# Patient Record
Sex: MALE | Race: WHITE | Employment: OTHER
[De-identification: names, ages, dates, MRNs, and addresses within clinical notes are randomized per-mention and may not be internally consistent; named-entity substitution may affect disease eponyms.]

---

## 2017-01-06 ENCOUNTER — ANTI-COAG VISIT (OUTPATIENT)
Dept: FAMILY MEDICINE CLINIC | Facility: CLINIC | Age: 82
End: 2017-01-06

## 2017-01-06 DIAGNOSIS — I82.409 ACUTE THROMBOEMBOLISM OF DEEP VEINS OF LOWER EXTREMITY, UNSPECIFIED LATERALITY (HCC): ICD-10-CM

## 2017-01-06 LAB
INTERNATIONAL NORMALIZATION RATIO, POC: 2.9
PROTHROMBIN TIME, POC: 34.9

## 2017-01-06 PROCEDURE — 85610 PROTHROMBIN TIME: CPT | Performed by: PEDIATRICS

## 2017-01-17 DIAGNOSIS — F02.80 LATE ONSET ALZHEIMER'S DISEASE WITHOUT BEHAVIORAL DISTURBANCE (HCC): ICD-10-CM

## 2017-01-17 DIAGNOSIS — N40.0 BENIGN NON-NODULAR PROSTATIC HYPERPLASIA WITHOUT LOWER URINARY TRACT SYMPTOMS: ICD-10-CM

## 2017-01-17 DIAGNOSIS — I10 ESSENTIAL HYPERTENSION: ICD-10-CM

## 2017-01-17 DIAGNOSIS — I82.501 CHRONIC DEEP VEIN THROMBOSIS (DVT) OF RIGHT LOWER EXTREMITY, UNSPECIFIED VEIN (HCC): ICD-10-CM

## 2017-01-17 DIAGNOSIS — G30.1 LATE ONSET ALZHEIMER'S DISEASE WITHOUT BEHAVIORAL DISTURBANCE (HCC): ICD-10-CM

## 2017-01-17 DIAGNOSIS — E78.2 MIXED HYPERLIPIDEMIA: ICD-10-CM

## 2017-01-18 RX ORDER — LEVOTHYROXINE SODIUM 0.07 MG/1
75 TABLET ORAL DAILY
Qty: 90 TABLET | Refills: 1 | Status: SHIPPED | OUTPATIENT
Start: 2017-01-18 | End: 2017-07-10 | Stop reason: SDUPTHER

## 2017-01-18 RX ORDER — MEMANTINE HYDROCHLORIDE 5 MG/1
5 TABLET ORAL 2 TIMES DAILY
Qty: 180 TABLET | Refills: 1 | Status: SHIPPED | OUTPATIENT
Start: 2017-01-18 | End: 2017-07-06 | Stop reason: SDUPTHER

## 2017-01-18 RX ORDER — PANTOPRAZOLE SODIUM 40 MG/1
40 TABLET, DELAYED RELEASE ORAL DAILY
Qty: 90 TABLET | Refills: 1 | Status: SHIPPED | OUTPATIENT
Start: 2017-01-18 | End: 2017-07-10 | Stop reason: SDUPTHER

## 2017-01-18 RX ORDER — LOVASTATIN 40 MG/1
TABLET ORAL
Qty: 180 TABLET | Refills: 1 | Status: SHIPPED | OUTPATIENT
Start: 2017-01-18 | End: 2017-07-10 | Stop reason: SDUPTHER

## 2017-01-18 RX ORDER — ATENOLOL AND CHLORTHALIDONE TABLET 50; 25 MG/1; MG/1
0.5 TABLET ORAL DAILY
Qty: 45 TABLET | Refills: 1 | Status: SHIPPED | OUTPATIENT
Start: 2017-01-18 | End: 2017-07-10 | Stop reason: SDUPTHER

## 2017-01-18 RX ORDER — DUTASTERIDE 0.5 MG/1
0.5 CAPSULE, LIQUID FILLED ORAL DAILY
Qty: 90 CAPSULE | Refills: 1 | Status: SHIPPED | OUTPATIENT
Start: 2017-01-18 | End: 2017-07-06 | Stop reason: SDUPTHER

## 2017-01-18 RX ORDER — WARFARIN SODIUM 5 MG/1
TABLET ORAL
Qty: 90 TABLET | Refills: 1 | Status: SHIPPED | OUTPATIENT
Start: 2017-01-18 | End: 2017-06-26 | Stop reason: ALTCHOICE

## 2017-01-18 RX ORDER — SILODOSIN 8 MG/1
8 CAPSULE ORAL EVERY EVENING
Qty: 90 CAPSULE | Refills: 1 | Status: SHIPPED | OUTPATIENT
Start: 2017-01-18 | End: 2017-07-10 | Stop reason: SDUPTHER

## 2017-01-18 RX ORDER — DONEPEZIL HYDROCHLORIDE 10 MG/1
10 TABLET, FILM COATED ORAL NIGHTLY
Qty: 90 TABLET | Refills: 1 | Status: SHIPPED | OUTPATIENT
Start: 2017-01-18 | End: 2017-07-10 | Stop reason: SDUPTHER

## 2017-01-18 RX ORDER — POTASSIUM CITRATE 10 MEQ/1
30 TABLET, EXTENDED RELEASE ORAL 2 TIMES DAILY
Qty: 540 TABLET | Refills: 1 | Status: SHIPPED | OUTPATIENT
Start: 2017-01-18 | End: 2017-07-06 | Stop reason: SDUPTHER

## 2017-02-03 ENCOUNTER — ANTI-COAG VISIT (OUTPATIENT)
Dept: FAMILY MEDICINE CLINIC | Facility: CLINIC | Age: 82
End: 2017-02-03

## 2017-02-03 DIAGNOSIS — I82.409 ACUTE THROMBOEMBOLISM OF DEEP VEINS OF LOWER EXTREMITY, UNSPECIFIED LATERALITY (HCC): ICD-10-CM

## 2017-02-03 LAB
INTERNATIONAL NORMALIZATION RATIO, POC: 2.1
PROTHROMBIN TIME, POC: 25.4

## 2017-02-03 PROCEDURE — 85610 PROTHROMBIN TIME: CPT | Performed by: PEDIATRICS

## 2017-03-02 ENCOUNTER — ANTI-COAG VISIT (OUTPATIENT)
Dept: FAMILY MEDICINE CLINIC | Facility: CLINIC | Age: 82
End: 2017-03-02

## 2017-03-02 VITALS — TEMPERATURE: 97.4 F | HEART RATE: 84 BPM | SYSTOLIC BLOOD PRESSURE: 124 MMHG | DIASTOLIC BLOOD PRESSURE: 72 MMHG

## 2017-03-02 DIAGNOSIS — I82.409 ACUTE THROMBOEMBOLISM OF DEEP VEINS OF LOWER EXTREMITY, UNSPECIFIED LATERALITY (HCC): ICD-10-CM

## 2017-03-02 LAB
INTERNATIONAL NORMALIZATION RATIO, POC: 1.9
PROTHROMBIN TIME, POC: 22.7

## 2017-03-02 PROCEDURE — 85610 PROTHROMBIN TIME: CPT | Performed by: PEDIATRICS

## 2017-03-31 ENCOUNTER — ANTI-COAG VISIT (OUTPATIENT)
Dept: FAMILY MEDICINE CLINIC | Age: 82
End: 2017-03-31

## 2017-03-31 DIAGNOSIS — I82.409 ACUTE THROMBOEMBOLISM OF DEEP VEINS OF LOWER EXTREMITY, UNSPECIFIED LATERALITY (HCC): ICD-10-CM

## 2017-03-31 LAB
INTERNATIONAL NORMALIZATION RATIO, POC: 2.3
PROTHROMBIN TIME, POC: 27.7

## 2017-04-28 ENCOUNTER — ANTI-COAG VISIT (OUTPATIENT)
Dept: FAMILY MEDICINE CLINIC | Age: 82
End: 2017-04-28
Payer: MEDICARE

## 2017-04-28 DIAGNOSIS — I82.409 ACUTE THROMBOEMBOLISM OF DEEP VEINS OF LOWER EXTREMITY, UNSPECIFIED LATERALITY (HCC): ICD-10-CM

## 2017-04-28 LAB
INTERNATIONAL NORMALIZATION RATIO, POC: 2.2
PROTHROMBIN TIME, POC: 26.3

## 2017-04-28 PROCEDURE — 85610 PROTHROMBIN TIME: CPT | Performed by: NURSE PRACTITIONER

## 2017-05-26 ENCOUNTER — ANTI-COAG VISIT (OUTPATIENT)
Dept: FAMILY MEDICINE CLINIC | Age: 82
End: 2017-05-26
Payer: MEDICARE

## 2017-05-26 DIAGNOSIS — I82.409 ACUTE THROMBOEMBOLISM OF DEEP VEINS OF LOWER EXTREMITY, UNSPECIFIED LATERALITY (HCC): ICD-10-CM

## 2017-05-26 LAB
INTERNATIONAL NORMALIZATION RATIO, POC: 3
PROTHROMBIN TIME, POC: 36.1

## 2017-05-26 PROCEDURE — 85610 PROTHROMBIN TIME: CPT | Performed by: PEDIATRICS

## 2017-06-16 ENCOUNTER — ANTI-COAG VISIT (OUTPATIENT)
Dept: FAMILY MEDICINE CLINIC | Age: 82
End: 2017-06-16
Payer: MEDICARE

## 2017-06-16 DIAGNOSIS — I82.409 ACUTE THROMBOEMBOLISM OF DEEP VEINS OF LOWER EXTREMITY, UNSPECIFIED LATERALITY (HCC): ICD-10-CM

## 2017-06-16 LAB
INTERNATIONAL NORMALIZATION RATIO, POC: 4.1
PROTHROMBIN TIME, POC: 49.5

## 2017-06-16 PROCEDURE — 85610 PROTHROMBIN TIME: CPT | Performed by: PEDIATRICS

## 2017-06-19 ENCOUNTER — ANTI-COAG VISIT (OUTPATIENT)
Dept: FAMILY MEDICINE CLINIC | Age: 82
End: 2017-06-19
Payer: MEDICARE

## 2017-06-19 DIAGNOSIS — I82.409 ACUTE THROMBOEMBOLISM OF DEEP VEINS OF LOWER EXTREMITY, UNSPECIFIED LATERALITY (HCC): ICD-10-CM

## 2017-06-19 LAB
INTERNATIONAL NORMALIZATION RATIO, POC: 1.9
PROTHROMBIN TIME, POC: 22.5

## 2017-06-19 PROCEDURE — 85610 PROTHROMBIN TIME: CPT | Performed by: PEDIATRICS

## 2017-06-19 PROCEDURE — 1036F TOBACCO NON-USER: CPT | Performed by: PEDIATRICS

## 2017-06-20 ENCOUNTER — TELEPHONE (OUTPATIENT)
Dept: FAMILY MEDICINE CLINIC | Age: 82
End: 2017-06-20

## 2017-06-20 DIAGNOSIS — E78.2 MIXED HYPERLIPIDEMIA: ICD-10-CM

## 2017-06-20 DIAGNOSIS — E03.9 HYPOTHYROIDISM, UNSPECIFIED TYPE: ICD-10-CM

## 2017-06-20 DIAGNOSIS — I10 ESSENTIAL HYPERTENSION: Primary | ICD-10-CM

## 2017-06-22 ENCOUNTER — TELEPHONE (OUTPATIENT)
Dept: FAMILY MEDICINE CLINIC | Age: 82
End: 2017-06-22

## 2017-06-22 ENCOUNTER — ANTI-COAG VISIT (OUTPATIENT)
Dept: FAMILY MEDICINE CLINIC | Age: 82
End: 2017-06-22
Payer: MEDICARE

## 2017-06-22 DIAGNOSIS — I82.409 ACUTE THROMBOEMBOLISM OF DEEP VEINS OF LOWER EXTREMITY, UNSPECIFIED LATERALITY (HCC): ICD-10-CM

## 2017-06-22 LAB
INTERNATIONAL NORMALIZATION RATIO, POC: 1.3
PROTHROMBIN TIME, POC: 15.1

## 2017-06-22 PROCEDURE — 85610 PROTHROMBIN TIME: CPT | Performed by: NURSE PRACTITIONER

## 2017-06-23 ENCOUNTER — HOSPITAL ENCOUNTER (OUTPATIENT)
Age: 82
Setting detail: SPECIMEN
Discharge: HOME | End: 2017-06-23
Payer: MEDICARE

## 2017-06-23 DIAGNOSIS — E78.2 MIXED HYPERLIPIDEMIA: ICD-10-CM

## 2017-06-23 DIAGNOSIS — E03.9 HYPOTHYROIDISM, UNSPECIFIED TYPE: ICD-10-CM

## 2017-06-23 DIAGNOSIS — I10 ESSENTIAL HYPERTENSION: ICD-10-CM

## 2017-06-23 LAB
ALBUMIN SERPL-MCNC: 4 G/DL (ref 3.5–5.2)
ALBUMIN/GLOBULIN RATIO: 1.9 (ref 1–2.5)
ALP BLD-CCNC: 92 U/L (ref 40–129)
ALT SERPL-CCNC: 8 U/L (ref 5–41)
ANION GAP SERPL CALCULATED.3IONS-SCNC: 12 MMOL/L (ref 9–17)
AST SERPL-CCNC: 13 U/L
BILIRUB SERPL-MCNC: 0.69 MG/DL (ref 0.3–1.2)
BUN BLDV-MCNC: 21 MG/DL (ref 8–23)
BUN/CREAT BLD: ABNORMAL (ref 9–20)
CALCIUM SERPL-MCNC: 10 MG/DL (ref 8.6–10.4)
CHLORIDE BLD-SCNC: 103 MMOL/L (ref 98–107)
CHOLESTEROL, FASTING: 171 MG/DL
CHOLESTEROL/HDL RATIO: 4
CO2: 29 MMOL/L (ref 20–31)
CREAT SERPL-MCNC: 1.42 MG/DL (ref 0.7–1.2)
GFR AFRICAN AMERICAN: 57 ML/MIN
GFR NON-AFRICAN AMERICAN: 47 ML/MIN
GFR SERPL CREATININE-BSD FRML MDRD: ABNORMAL ML/MIN/{1.73_M2}
GFR SERPL CREATININE-BSD FRML MDRD: ABNORMAL ML/MIN/{1.73_M2}
GLUCOSE FASTING: 87 MG/DL (ref 70–99)
HCT VFR BLD CALC: 47 % (ref 41–53)
HDLC SERPL-MCNC: 43 MG/DL
HEMOGLOBIN: 15.7 G/DL (ref 13.5–17.5)
LDL CHOLESTEROL: 90 MG/DL (ref 0–130)
MCH RBC QN AUTO: 31.4 PG (ref 26–34)
MCHC RBC AUTO-ENTMCNC: 33.5 G/DL (ref 31–37)
MCV RBC AUTO: 93.6 FL (ref 80–100)
PDW BLD-RTO: 15.4 % (ref 12.5–15.4)
PLATELET # BLD: 165 K/UL (ref 140–450)
PMV BLD AUTO: 9.9 FL (ref 6–12)
POTASSIUM SERPL-SCNC: 4.6 MMOL/L (ref 3.7–5.3)
RBC # BLD: 5.02 M/UL (ref 4.5–5.9)
SODIUM BLD-SCNC: 144 MMOL/L (ref 135–144)
TOTAL PROTEIN: 6.1 G/DL (ref 6.4–8.3)
TRIGLYCERIDE, FASTING: 192 MG/DL
TSH SERPL DL<=0.05 MIU/L-ACNC: 3.8 MIU/L (ref 0.3–5)
VLDLC SERPL CALC-MCNC: ABNORMAL MG/DL (ref 1–30)
WBC # BLD: 7.9 K/UL (ref 3.5–11)

## 2017-06-26 ENCOUNTER — OFFICE VISIT (OUTPATIENT)
Dept: FAMILY MEDICINE CLINIC | Age: 82
End: 2017-06-26
Payer: MEDICARE

## 2017-06-26 VITALS
WEIGHT: 180 LBS | HEIGHT: 67 IN | BODY MASS INDEX: 28.25 KG/M2 | DIASTOLIC BLOOD PRESSURE: 62 MMHG | RESPIRATION RATE: 20 BRPM | HEART RATE: 60 BPM | SYSTOLIC BLOOD PRESSURE: 90 MMHG | TEMPERATURE: 96 F

## 2017-06-26 DIAGNOSIS — E78.00 PURE HYPERCHOLESTEROLEMIA: ICD-10-CM

## 2017-06-26 DIAGNOSIS — F02.80 LATE ONSET ALZHEIMER'S DISEASE WITHOUT BEHAVIORAL DISTURBANCE (HCC): ICD-10-CM

## 2017-06-26 DIAGNOSIS — K21.9 GASTROESOPHAGEAL REFLUX DISEASE WITHOUT ESOPHAGITIS: ICD-10-CM

## 2017-06-26 DIAGNOSIS — G30.1 LATE ONSET ALZHEIMER'S DISEASE WITHOUT BEHAVIORAL DISTURBANCE (HCC): ICD-10-CM

## 2017-06-26 DIAGNOSIS — I82.501 CHRONIC DEEP VEIN THROMBOSIS (DVT) OF RIGHT LOWER EXTREMITY, UNSPECIFIED VEIN (HCC): ICD-10-CM

## 2017-06-26 DIAGNOSIS — I10 ESSENTIAL HYPERTENSION: Primary | ICD-10-CM

## 2017-06-26 DIAGNOSIS — Z23 NEED FOR TDAP VACCINATION: ICD-10-CM

## 2017-06-26 DIAGNOSIS — E03.9 HYPOTHYROIDISM, UNSPECIFIED TYPE: ICD-10-CM

## 2017-06-26 PROCEDURE — G8419 CALC BMI OUT NRM PARAM NOF/U: HCPCS | Performed by: NURSE PRACTITIONER

## 2017-06-26 PROCEDURE — 1123F ACP DISCUSS/DSCN MKR DOCD: CPT | Performed by: NURSE PRACTITIONER

## 2017-06-26 PROCEDURE — G8598 ASA/ANTIPLAT THER USED: HCPCS | Performed by: NURSE PRACTITIONER

## 2017-06-26 PROCEDURE — G8427 DOCREV CUR MEDS BY ELIG CLIN: HCPCS | Performed by: NURSE PRACTITIONER

## 2017-06-26 PROCEDURE — 99214 OFFICE O/P EST MOD 30 MIN: CPT | Performed by: NURSE PRACTITIONER

## 2017-06-26 PROCEDURE — 1036F TOBACCO NON-USER: CPT | Performed by: NURSE PRACTITIONER

## 2017-06-26 PROCEDURE — 4040F PNEUMOC VAC/ADMIN/RCVD: CPT | Performed by: NURSE PRACTITIONER

## 2017-06-26 ASSESSMENT — ENCOUNTER SYMPTOMS
COUGH: 0
CONSTIPATION: 0
SORE THROAT: 0
TROUBLE SWALLOWING: 0
EYE PAIN: 0
WHEEZING: 0
ABDOMINAL PAIN: 0
SHORTNESS OF BREATH: 0
EYE DISCHARGE: 0
BACK PAIN: 0
NAUSEA: 0
VOMITING: 0
DIARRHEA: 1
RHINORRHEA: 1

## 2017-06-27 DIAGNOSIS — I82.501 CHRONIC DEEP VEIN THROMBOSIS (DVT) OF RIGHT LOWER EXTREMITY, UNSPECIFIED VEIN (HCC): ICD-10-CM

## 2017-07-06 DIAGNOSIS — G30.1 LATE ONSET ALZHEIMER'S DISEASE WITHOUT BEHAVIORAL DISTURBANCE (HCC): ICD-10-CM

## 2017-07-06 DIAGNOSIS — I82.501 CHRONIC DEEP VEIN THROMBOSIS (DVT) OF RIGHT LOWER EXTREMITY, UNSPECIFIED VEIN (HCC): ICD-10-CM

## 2017-07-06 DIAGNOSIS — F02.80 LATE ONSET ALZHEIMER'S DISEASE WITHOUT BEHAVIORAL DISTURBANCE (HCC): ICD-10-CM

## 2017-07-06 RX ORDER — WARFARIN SODIUM 5 MG/1
TABLET ORAL
Qty: 65 TABLET | Refills: 1 | OUTPATIENT
Start: 2017-07-06

## 2017-07-07 RX ORDER — MEMANTINE HYDROCHLORIDE 5 MG/1
TABLET ORAL
Qty: 180 TABLET | Refills: 1 | Status: SHIPPED | OUTPATIENT
Start: 2017-07-07 | End: 2018-01-03 | Stop reason: SDUPTHER

## 2017-07-07 RX ORDER — POTASSIUM CITRATE 10 MEQ/1
TABLET, EXTENDED RELEASE ORAL
Qty: 540 TABLET | Refills: 1 | Status: SHIPPED | OUTPATIENT
Start: 2017-07-07 | End: 2018-01-03 | Stop reason: SDUPTHER

## 2017-07-07 RX ORDER — DUTASTERIDE 0.5 MG/1
CAPSULE, LIQUID FILLED ORAL
Qty: 90 CAPSULE | Refills: 1 | Status: SHIPPED | OUTPATIENT
Start: 2017-07-07 | End: 2018-01-03 | Stop reason: SDUPTHER

## 2017-07-10 DIAGNOSIS — F02.80 LATE ONSET ALZHEIMER'S DISEASE WITHOUT BEHAVIORAL DISTURBANCE (HCC): ICD-10-CM

## 2017-07-10 DIAGNOSIS — I10 ESSENTIAL HYPERTENSION: ICD-10-CM

## 2017-07-10 DIAGNOSIS — N40.0 BENIGN NON-NODULAR PROSTATIC HYPERPLASIA WITHOUT LOWER URINARY TRACT SYMPTOMS: ICD-10-CM

## 2017-07-10 DIAGNOSIS — E78.2 MIXED HYPERLIPIDEMIA: ICD-10-CM

## 2017-07-10 DIAGNOSIS — G30.1 LATE ONSET ALZHEIMER'S DISEASE WITHOUT BEHAVIORAL DISTURBANCE (HCC): ICD-10-CM

## 2017-07-10 RX ORDER — LOVASTATIN 40 MG/1
TABLET ORAL
Qty: 180 TABLET | Refills: 1 | Status: SHIPPED | OUTPATIENT
Start: 2017-07-10 | End: 2018-01-03 | Stop reason: SDUPTHER

## 2017-07-10 RX ORDER — LEVOTHYROXINE SODIUM 0.07 MG/1
75 TABLET ORAL DAILY
Qty: 90 TABLET | Refills: 3 | Status: SHIPPED | OUTPATIENT
Start: 2017-07-10 | End: 2018-06-24 | Stop reason: SDUPTHER

## 2017-07-10 RX ORDER — ATENOLOL AND CHLORTHALIDONE TABLET 50; 25 MG/1; MG/1
0.5 TABLET ORAL DAILY
Qty: 45 TABLET | Refills: 1 | Status: SHIPPED | OUTPATIENT
Start: 2017-07-10 | End: 2018-01-03 | Stop reason: SDUPTHER

## 2017-07-10 RX ORDER — DONEPEZIL HYDROCHLORIDE 10 MG/1
10 TABLET, FILM COATED ORAL NIGHTLY
Qty: 90 TABLET | Refills: 1 | Status: SHIPPED | OUTPATIENT
Start: 2017-07-10 | End: 2018-02-01 | Stop reason: SDUPTHER

## 2017-07-10 RX ORDER — SILODOSIN 8 MG/1
8 CAPSULE ORAL EVERY EVENING
Qty: 90 CAPSULE | Refills: 1 | Status: SHIPPED | OUTPATIENT
Start: 2017-07-10 | End: 2018-01-03 | Stop reason: SDUPTHER

## 2017-07-10 RX ORDER — PANTOPRAZOLE SODIUM 40 MG/1
40 TABLET, DELAYED RELEASE ORAL DAILY
Qty: 90 TABLET | Refills: 1 | Status: SHIPPED | OUTPATIENT
Start: 2017-07-10 | End: 2018-01-03 | Stop reason: SDUPTHER

## 2017-12-06 ENCOUNTER — ANTI-COAG VISIT (OUTPATIENT)
Dept: FAMILY MEDICINE CLINIC | Age: 82
End: 2017-12-06

## 2018-01-03 DIAGNOSIS — F02.80 LATE ONSET ALZHEIMER'S DISEASE WITHOUT BEHAVIORAL DISTURBANCE (HCC): ICD-10-CM

## 2018-01-03 DIAGNOSIS — G30.1 LATE ONSET ALZHEIMER'S DISEASE WITHOUT BEHAVIORAL DISTURBANCE (HCC): ICD-10-CM

## 2018-01-03 DIAGNOSIS — N40.0 BENIGN NON-NODULAR PROSTATIC HYPERPLASIA WITHOUT LOWER URINARY TRACT SYMPTOMS: ICD-10-CM

## 2018-01-03 DIAGNOSIS — E78.2 MIXED HYPERLIPIDEMIA: ICD-10-CM

## 2018-01-03 DIAGNOSIS — I10 ESSENTIAL HYPERTENSION: ICD-10-CM

## 2018-01-03 RX ORDER — POTASSIUM CITRATE 10 MEQ/1
30 TABLET, EXTENDED RELEASE ORAL 2 TIMES DAILY
Qty: 540 TABLET | Refills: 1 | Status: SHIPPED | OUTPATIENT
Start: 2018-01-03 | End: 2020-08-11

## 2018-01-03 RX ORDER — SILODOSIN 8 MG/1
8 CAPSULE ORAL EVERY EVENING
Qty: 90 CAPSULE | Refills: 1 | Status: SHIPPED | OUTPATIENT
Start: 2018-01-03 | End: 2018-06-25 | Stop reason: SDUPTHER

## 2018-01-03 RX ORDER — MEMANTINE HYDROCHLORIDE 5 MG/1
5 TABLET ORAL 2 TIMES DAILY
Qty: 180 TABLET | Refills: 1 | Status: SHIPPED | OUTPATIENT
Start: 2018-01-03 | End: 2018-06-25 | Stop reason: SDUPTHER

## 2018-01-03 RX ORDER — ATENOLOL AND CHLORTHALIDONE TABLET 50; 25 MG/1; MG/1
0.5 TABLET ORAL DAILY
Qty: 45 TABLET | Refills: 1 | Status: SHIPPED | OUTPATIENT
Start: 2018-01-03 | End: 2018-01-31 | Stop reason: ALTCHOICE

## 2018-01-03 RX ORDER — PANTOPRAZOLE SODIUM 40 MG/1
40 TABLET, DELAYED RELEASE ORAL DAILY
Qty: 90 TABLET | Refills: 1 | Status: SHIPPED | OUTPATIENT
Start: 2018-01-03 | End: 2018-06-25 | Stop reason: SDUPTHER

## 2018-01-03 RX ORDER — LOVASTATIN 40 MG/1
80 TABLET ORAL NIGHTLY
Qty: 180 TABLET | Refills: 1 | Status: SHIPPED | OUTPATIENT
Start: 2018-01-03 | End: 2018-06-25 | Stop reason: SDUPTHER

## 2018-01-03 RX ORDER — DUTASTERIDE 0.5 MG/1
0.5 CAPSULE, LIQUID FILLED ORAL DAILY
Qty: 90 CAPSULE | Refills: 1 | Status: SHIPPED | OUTPATIENT
Start: 2018-01-03 | End: 2018-06-25 | Stop reason: SDUPTHER

## 2018-01-03 NOTE — TELEPHONE ENCOUNTER
LOV: 06/26/17  RTO: 3 months  LR: 07/10/17    Health Maintenance   Topic Date Due    DTaP/Tdap/Td vaccine (1 - Tdap) 03/05/1945    Zostavax vaccine  03/05/1986    Flu vaccine (1) 09/01/2017    Pneumococcal low/med risk  Completed             (applicable per patient's age: Cancer Screenings, Depression Screening, Fall Risk Screening, Immunizations)    Hemoglobin A1C (%)   Date Value   10/15/2015 5.1   05/21/2013 5.6     LDL Cholesterol (mg/dL)   Date Value   06/23/2017 90     AST (U/L)   Date Value   06/23/2017 13     ALT (U/L)   Date Value   06/23/2017 8     BUN (mg/dL)   Date Value   06/23/2017 21      (goal A1C is < 7)   (goal LDL is <100) need 30-50% reduction from baseline     BP Readings from Last 3 Encounters:   06/26/17 90/62   03/02/17 124/72   10/14/16 130/80    (goal /80)      All Future Testing planned in [facility]:  Lab Frequency Next Occurrence   POCT INR         Next Visit Date:  Future Appointments  Date Time Provider Treasure De   1/15/2018 10:40 AM Cody Pérez MD [facility]            Patient Active Problem List:     BPH (benign prostatic hyperplasia)     Hypertension     Hyperlipidemia     Hypothyroidism     CAD (coronary artery disease)     Deep venous thrombosis of leg (HCC)     GERD (gastroesophageal reflux disease)     Alzheimer's dementia

## 2018-01-12 DIAGNOSIS — I82.501 CHRONIC DEEP VEIN THROMBOSIS (DVT) OF RIGHT LOWER EXTREMITY, UNSPECIFIED VEIN (HCC): ICD-10-CM

## 2018-01-15 ENCOUNTER — OFFICE VISIT (OUTPATIENT)
Dept: FAMILY MEDICINE CLINIC | Age: 83
End: 2018-01-15
Payer: MEDICARE

## 2018-01-15 VITALS
BODY MASS INDEX: 28.41 KG/M2 | DIASTOLIC BLOOD PRESSURE: 56 MMHG | HEART RATE: 60 BPM | TEMPERATURE: 96.1 F | WEIGHT: 181 LBS | RESPIRATION RATE: 16 BRPM | SYSTOLIC BLOOD PRESSURE: 90 MMHG | HEIGHT: 67 IN

## 2018-01-15 DIAGNOSIS — I25.10 CORONARY ARTERY DISEASE INVOLVING NATIVE CORONARY ARTERY OF NATIVE HEART WITHOUT ANGINA PECTORIS: ICD-10-CM

## 2018-01-15 DIAGNOSIS — Z86.718 HISTORY OF DVT OF LOWER EXTREMITY: ICD-10-CM

## 2018-01-15 DIAGNOSIS — N18.30 CHRONIC KIDNEY DISEASE, STAGE 3 (HCC): ICD-10-CM

## 2018-01-15 DIAGNOSIS — E78.00 PURE HYPERCHOLESTEROLEMIA: Primary | ICD-10-CM

## 2018-01-15 DIAGNOSIS — I10 ESSENTIAL HYPERTENSION: ICD-10-CM

## 2018-01-15 DIAGNOSIS — I70.0 THORACIC AORTIC ATHEROSCLEROSIS (HCC): ICD-10-CM

## 2018-01-15 PROCEDURE — 99214 OFFICE O/P EST MOD 30 MIN: CPT | Performed by: PEDIATRICS

## 2018-01-15 PROCEDURE — 1123F ACP DISCUSS/DSCN MKR DOCD: CPT | Performed by: PEDIATRICS

## 2018-01-15 PROCEDURE — G8598 ASA/ANTIPLAT THER USED: HCPCS | Performed by: PEDIATRICS

## 2018-01-15 PROCEDURE — G8419 CALC BMI OUT NRM PARAM NOF/U: HCPCS | Performed by: PEDIATRICS

## 2018-01-15 PROCEDURE — 4040F PNEUMOC VAC/ADMIN/RCVD: CPT | Performed by: PEDIATRICS

## 2018-01-15 PROCEDURE — G8427 DOCREV CUR MEDS BY ELIG CLIN: HCPCS | Performed by: PEDIATRICS

## 2018-01-15 PROCEDURE — G8482 FLU IMMUNIZE ORDER/ADMIN: HCPCS | Performed by: PEDIATRICS

## 2018-01-15 PROCEDURE — 1036F TOBACCO NON-USER: CPT | Performed by: PEDIATRICS

## 2018-01-15 ASSESSMENT — PATIENT HEALTH QUESTIONNAIRE - PHQ9
1. LITTLE INTEREST OR PLEASURE IN DOING THINGS: 0
2. FEELING DOWN, DEPRESSED OR HOPELESS: 0
SUM OF ALL RESPONSES TO PHQ9 QUESTIONS 1 & 2: 0
SUM OF ALL RESPONSES TO PHQ QUESTIONS 1-9: 0

## 2018-01-15 ASSESSMENT — ENCOUNTER SYMPTOMS
CONSTIPATION: 0
SHORTNESS OF BREATH: 0
COUGH: 0
NAUSEA: 0
WHEEZING: 0
DIARRHEA: 0

## 2018-01-23 ENCOUNTER — HOSPITAL ENCOUNTER (OUTPATIENT)
Age: 83
Setting detail: SPECIMEN
Discharge: HOME | End: 2018-01-23
Payer: MEDICARE

## 2018-01-23 DIAGNOSIS — E78.00 PURE HYPERCHOLESTEROLEMIA: ICD-10-CM

## 2018-01-23 DIAGNOSIS — I10 ESSENTIAL HYPERTENSION: ICD-10-CM

## 2018-01-23 LAB
ALBUMIN SERPL-MCNC: 4.3 G/DL (ref 3.5–5.2)
ALBUMIN/GLOBULIN RATIO: 1.9 (ref 1–2.5)
ALP BLD-CCNC: 102 U/L (ref 40–129)
ALT SERPL-CCNC: 10 U/L (ref 5–41)
ANION GAP SERPL CALCULATED.3IONS-SCNC: 24 MMOL/L (ref 9–17)
AST SERPL-CCNC: 16 U/L
BILIRUB SERPL-MCNC: 1.06 MG/DL (ref 0.3–1.2)
BUN BLDV-MCNC: 31 MG/DL (ref 8–23)
BUN/CREAT BLD: ABNORMAL (ref 9–20)
CALCIUM SERPL-MCNC: 10.1 MG/DL (ref 8.6–10.4)
CHLORIDE BLD-SCNC: 97 MMOL/L (ref 98–107)
CHOLESTEROL, FASTING: 167 MG/DL
CHOLESTEROL/HDL RATIO: 3.6
CO2: 21 MMOL/L (ref 20–31)
CREAT SERPL-MCNC: 1.47 MG/DL (ref 0.7–1.2)
GFR AFRICAN AMERICAN: 54 ML/MIN
GFR NON-AFRICAN AMERICAN: 45 ML/MIN
GFR SERPL CREATININE-BSD FRML MDRD: ABNORMAL ML/MIN/{1.73_M2}
GFR SERPL CREATININE-BSD FRML MDRD: ABNORMAL ML/MIN/{1.73_M2}
GLUCOSE FASTING: 68 MG/DL (ref 70–99)
HCT VFR BLD CALC: 53.2 % (ref 40.7–50.3)
HDLC SERPL-MCNC: 47 MG/DL
HEMOGLOBIN: 17.1 G/DL (ref 13–17)
LDL CHOLESTEROL: 88 MG/DL (ref 0–130)
MCH RBC QN AUTO: 31.1 PG (ref 25.2–33.5)
MCHC RBC AUTO-ENTMCNC: 32.1 G/DL (ref 28.4–34.8)
MCV RBC AUTO: 96.7 FL (ref 82.6–102.9)
NRBC AUTOMATED: 0 PER 100 WBC
PDW BLD-RTO: 13.5 % (ref 11.8–14.4)
PLATELET # BLD: 227 K/UL (ref 138–453)
PMV BLD AUTO: 11.8 FL (ref 8.1–13.5)
POTASSIUM SERPL-SCNC: 4.6 MMOL/L (ref 3.7–5.3)
RBC # BLD: 5.5 M/UL (ref 4.21–5.77)
SODIUM BLD-SCNC: 142 MMOL/L (ref 135–144)
TOTAL PROTEIN: 6.6 G/DL (ref 6.4–8.3)
TRIGLYCERIDE, FASTING: 161 MG/DL
VLDLC SERPL CALC-MCNC: ABNORMAL MG/DL (ref 1–30)
WBC # BLD: 10.5 K/UL (ref 3.5–11.3)

## 2018-01-31 ENCOUNTER — NURSE ONLY (OUTPATIENT)
Dept: FAMILY MEDICINE CLINIC | Age: 83
End: 2018-01-31
Payer: MEDICARE

## 2018-01-31 VITALS — HEART RATE: 77 BPM | SYSTOLIC BLOOD PRESSURE: 113 MMHG | DIASTOLIC BLOOD PRESSURE: 68 MMHG | OXYGEN SATURATION: 96 %

## 2018-01-31 DIAGNOSIS — E86.0 DEHYDRATION: Primary | ICD-10-CM

## 2018-01-31 PROCEDURE — 99211 OFF/OP EST MAY X REQ PHY/QHP: CPT | Performed by: NURSE PRACTITIONER

## 2018-01-31 NOTE — PROGRESS NOTES
Patient presents in office today for a blood pressure check. He has not been monitoring his blood pressure at home but is taking all medications as prescribed. Patient denies chest pain, palpitations, headaches or dizziness. Denies any concerns at this time. He is alert, dressed appropriately for the weather and no signs of distress.

## 2018-01-31 NOTE — PROGRESS NOTES
Son aware of results and instructions. Repeat appt scheduled. Unsure if labs are ordered for future due to being Active ordered and expected date of 1/31. Labs were placed today by CLAUDIA DOE.  BP

## 2018-02-01 DIAGNOSIS — F02.80 LATE ONSET ALZHEIMER'S DISEASE WITHOUT BEHAVIORAL DISTURBANCE (HCC): ICD-10-CM

## 2018-02-01 DIAGNOSIS — G30.1 LATE ONSET ALZHEIMER'S DISEASE WITHOUT BEHAVIORAL DISTURBANCE (HCC): ICD-10-CM

## 2018-02-01 NOTE — TELEPHONE ENCOUNTER
LOV 1-15-18  RTO 6 months post  [facility] 7-10-17  Health Maintenance   Topic Date Due    DTaP/Tdap/Td vaccine (1 - Tdap) 03[birth year]/1945    Zostavax vaccine  06/01/2018 (Originally 3/5/1986)    TSH testing  06/23/2018    Potassium monitoring  01/23/2019    Creatinine monitoring  01/23/2019    Flu vaccine  Completed    Pneumococcal low/med risk  Completed             (applicable per patient's age: Cancer Screenings, Depression Screening, Fall Risk Screening, Immunizations)    Hemoglobin A1C (%)   Date Value   10/15/2015 5.1   05/21/2013 5.6     LDL Cholesterol (mg/dL)   Date Value   01/23/2018 88     AST (U/L)   Date Value   01/23/2018 16     ALT (U/L)   Date Value   01/23/2018 10     BUN (mg/dL)   Date Value   01/23/2018 31 (H)      (goal A1C is < 7)   (goal LDL is <100) need 30-50% reduction from baseline     BP Readings from Last 3 Encounters:   01/31/18 113/68   01/15/18 (!) 90/56   06/26/17 90/62    (goal /80)      All Future Testing planned in [facility]:  Lab Frequency Next Occurrence   Basic Metabolic Panel Once 86/28/6177       Next Visit Date:  Future Appointments  Date Time Provider Treasure De   2/14/2018 10:00 AM SCHEDULE, ARNOLD GONZALEZ TOLPP            Patient Active Problem List:     BPH (benign prostatic hyperplasia)     Hypertension     Hyperlipidemia     Hypothyroidism     CAD (coronary artery disease)     GERD (gastroesophageal reflux disease)     Alzheimer's dementia     History of DVT of lower extremity     Chronic kidney disease, stage 3     Thoracic aortic atherosclerosis ([facility] [address]

## 2018-02-02 RX ORDER — DONEPEZIL HYDROCHLORIDE 10 MG/1
10 TABLET, FILM COATED ORAL NIGHTLY
Qty: 90 TABLET | Refills: 1 | Status: SHIPPED | OUTPATIENT
Start: 2018-02-02 | End: 2018-07-11 | Stop reason: SDUPTHER

## 2018-02-14 ENCOUNTER — NURSE ONLY (OUTPATIENT)
Dept: FAMILY MEDICINE CLINIC | Age: 83
End: 2018-02-14
Payer: MEDICARE

## 2018-02-14 ENCOUNTER — HOSPITAL ENCOUNTER (OUTPATIENT)
Age: 83
Setting detail: SPECIMEN
Discharge: HOME | End: 2018-02-14
Payer: MEDICARE

## 2018-02-14 VITALS — SYSTOLIC BLOOD PRESSURE: 139 MMHG | TEMPERATURE: 96.7 F | DIASTOLIC BLOOD PRESSURE: 77 MMHG | HEART RATE: 83 BPM

## 2018-02-14 DIAGNOSIS — E86.0 DEHYDRATION: ICD-10-CM

## 2018-02-14 LAB
-: NORMAL
AMORPHOUS: NORMAL
ANION GAP SERPL CALCULATED.3IONS-SCNC: 15 MMOL/L (ref 9–17)
BACTERIA: NORMAL
BILIRUBIN URINE: NEGATIVE
BUN BLDV-MCNC: 14 MG/DL (ref 8–23)
BUN/CREAT BLD: ABNORMAL (ref 9–20)
CALCIUM SERPL-MCNC: 9.5 MG/DL (ref 8.6–10.4)
CASTS UA: NORMAL /LPF (ref 0–8)
CHLORIDE BLD-SCNC: 101 MMOL/L (ref 98–107)
CO2: 24 MMOL/L (ref 20–31)
COLOR: YELLOW
COMMENT UA: ABNORMAL
CREAT SERPL-MCNC: 1.26 MG/DL (ref 0.7–1.2)
CRYSTALS, UA: NORMAL /HPF
EPITHELIAL CELLS UA: NORMAL /HPF (ref 0–5)
GFR AFRICAN AMERICAN: >60 ML/MIN
GFR NON-AFRICAN AMERICAN: 54 ML/MIN
GFR SERPL CREATININE-BSD FRML MDRD: ABNORMAL ML/MIN/{1.73_M2}
GFR SERPL CREATININE-BSD FRML MDRD: ABNORMAL ML/MIN/{1.73_M2}
GLUCOSE BLD-MCNC: 97 MG/DL (ref 70–99)
GLUCOSE URINE: NEGATIVE
KETONES, URINE: ABNORMAL
LEUKOCYTE ESTERASE, URINE: NEGATIVE
MUCUS: NORMAL
NITRITE, URINE: NEGATIVE
OTHER OBSERVATIONS UA: NORMAL
PH UA: 5.5 (ref 5–8)
POTASSIUM SERPL-SCNC: 4.2 MMOL/L (ref 3.7–5.3)
PROTEIN UA: NEGATIVE
RBC UA: NORMAL /HPF (ref 0–2)
RENAL EPITHELIAL, UA: NORMAL /HPF
SODIUM BLD-SCNC: 140 MMOL/L (ref 135–144)
SPECIFIC GRAVITY UA: 1.02 (ref 1–1.03)
TRICHOMONAS: NORMAL
TURBIDITY: ABNORMAL
URINE HGB: NEGATIVE
UROBILINOGEN, URINE: NORMAL
WBC UA: NORMAL /HPF (ref 0–5)
YEAST: NORMAL

## 2018-02-14 PROCEDURE — 99211 OFF/OP EST MAY X REQ PHY/QHP: CPT | Performed by: NURSE PRACTITIONER

## 2018-06-24 DIAGNOSIS — E78.2 MIXED HYPERLIPIDEMIA: ICD-10-CM

## 2018-06-24 DIAGNOSIS — N40.0 BENIGN NON-NODULAR PROSTATIC HYPERPLASIA WITHOUT LOWER URINARY TRACT SYMPTOMS: ICD-10-CM

## 2018-06-24 DIAGNOSIS — F02.80 LATE ONSET ALZHEIMER'S DISEASE WITHOUT BEHAVIORAL DISTURBANCE (HCC): ICD-10-CM

## 2018-06-24 DIAGNOSIS — I82.501 CHRONIC DEEP VEIN THROMBOSIS (DVT) OF RIGHT LOWER EXTREMITY, UNSPECIFIED VEIN (HCC): ICD-10-CM

## 2018-06-24 DIAGNOSIS — G30.1 LATE ONSET ALZHEIMER'S DISEASE WITHOUT BEHAVIORAL DISTURBANCE (HCC): ICD-10-CM

## 2018-06-25 RX ORDER — MEMANTINE HYDROCHLORIDE 5 MG/1
5 TABLET ORAL 2 TIMES DAILY
Qty: 180 TABLET | Refills: 1 | Status: SHIPPED | OUTPATIENT
Start: 2018-06-25 | End: 2018-12-21 | Stop reason: SDUPTHER

## 2018-06-25 RX ORDER — SILODOSIN 8 MG/1
8 CAPSULE ORAL EVERY EVENING
Qty: 90 CAPSULE | Refills: 1 | Status: SHIPPED | OUTPATIENT
Start: 2018-06-25 | End: 2018-12-21 | Stop reason: SDUPTHER

## 2018-06-25 RX ORDER — LOVASTATIN 40 MG/1
80 TABLET ORAL NIGHTLY
Qty: 180 TABLET | Refills: 1 | Status: SHIPPED | OUTPATIENT
Start: 2018-06-25 | End: 2018-12-21 | Stop reason: SDUPTHER

## 2018-06-25 RX ORDER — DUTASTERIDE 0.5 MG/1
0.5 CAPSULE, LIQUID FILLED ORAL DAILY
Qty: 90 CAPSULE | Refills: 1 | Status: SHIPPED | OUTPATIENT
Start: 2018-06-25 | End: 2018-12-21 | Stop reason: SDUPTHER

## 2018-06-25 RX ORDER — LEVOTHYROXINE SODIUM 0.07 MG/1
75 TABLET ORAL DAILY
Qty: 90 TABLET | Refills: 3 | Status: SHIPPED | OUTPATIENT
Start: 2018-06-25 | End: 2019-06-16 | Stop reason: SDUPTHER

## 2018-06-25 RX ORDER — PANTOPRAZOLE SODIUM 40 MG/1
40 TABLET, DELAYED RELEASE ORAL DAILY
Qty: 90 TABLET | Refills: 1 | Status: SHIPPED | OUTPATIENT
Start: 2018-06-25 | End: 2018-12-21 | Stop reason: SDUPTHER

## 2018-06-28 ENCOUNTER — TELEPHONE (OUTPATIENT)
Dept: FAMILY MEDICINE CLINIC | Age: 83
End: 2018-06-28

## 2018-07-11 DIAGNOSIS — F02.80 LATE ONSET ALZHEIMER'S DISEASE WITHOUT BEHAVIORAL DISTURBANCE (HCC): ICD-10-CM

## 2018-07-11 DIAGNOSIS — G30.1 LATE ONSET ALZHEIMER'S DISEASE WITHOUT BEHAVIORAL DISTURBANCE (HCC): ICD-10-CM

## 2018-07-12 RX ORDER — DONEPEZIL HYDROCHLORIDE 10 MG/1
10 TABLET, FILM COATED ORAL NIGHTLY
Qty: 90 TABLET | Refills: 1 | Status: SHIPPED | OUTPATIENT
Start: 2018-07-12 | End: 2019-01-18 | Stop reason: SDUPTHER

## 2018-07-16 ENCOUNTER — HOSPITAL ENCOUNTER (OUTPATIENT)
Age: 83
Setting detail: SPECIMEN
Discharge: HOME | End: 2018-07-16
Payer: MEDICARE

## 2018-07-16 ENCOUNTER — OFFICE VISIT (OUTPATIENT)
Dept: FAMILY MEDICINE CLINIC | Age: 83
End: 2018-07-16
Payer: MEDICARE

## 2018-07-16 VITALS
WEIGHT: 175.1 LBS | SYSTOLIC BLOOD PRESSURE: 122 MMHG | HEART RATE: 68 BPM | HEIGHT: 66 IN | TEMPERATURE: 97 F | RESPIRATION RATE: 16 BRPM | DIASTOLIC BLOOD PRESSURE: 70 MMHG | BODY MASS INDEX: 28.14 KG/M2

## 2018-07-16 DIAGNOSIS — N18.30 CHRONIC KIDNEY DISEASE, STAGE 3 (HCC): ICD-10-CM

## 2018-07-16 DIAGNOSIS — G30.1 LATE ONSET ALZHEIMER'S DISEASE WITHOUT BEHAVIORAL DISTURBANCE (HCC): ICD-10-CM

## 2018-07-16 DIAGNOSIS — E03.9 HYPOTHYROIDISM, UNSPECIFIED TYPE: Primary | ICD-10-CM

## 2018-07-16 DIAGNOSIS — F02.80 LATE ONSET ALZHEIMER'S DISEASE WITHOUT BEHAVIORAL DISTURBANCE (HCC): ICD-10-CM

## 2018-07-16 DIAGNOSIS — I70.0 THORACIC AORTIC ATHEROSCLEROSIS (HCC): ICD-10-CM

## 2018-07-16 DIAGNOSIS — E03.9 HYPOTHYROIDISM, UNSPECIFIED TYPE: ICD-10-CM

## 2018-07-16 DIAGNOSIS — I10 ESSENTIAL HYPERTENSION: ICD-10-CM

## 2018-07-16 LAB
ANION GAP SERPL CALCULATED.3IONS-SCNC: 12 MMOL/L (ref 9–17)
BUN BLDV-MCNC: 16 MG/DL (ref 8–23)
BUN/CREAT BLD: ABNORMAL (ref 9–20)
CALCIUM SERPL-MCNC: 10 MG/DL (ref 8.6–10.4)
CHLORIDE BLD-SCNC: 105 MMOL/L (ref 98–107)
CO2: 23 MMOL/L (ref 20–31)
CREAT SERPL-MCNC: 1.3 MG/DL (ref 0.7–1.2)
GFR AFRICAN AMERICAN: >60 ML/MIN
GFR NON-AFRICAN AMERICAN: 52 ML/MIN
GFR SERPL CREATININE-BSD FRML MDRD: ABNORMAL ML/MIN/{1.73_M2}
GFR SERPL CREATININE-BSD FRML MDRD: ABNORMAL ML/MIN/{1.73_M2}
GLUCOSE BLD-MCNC: 109 MG/DL (ref 70–99)
POTASSIUM SERPL-SCNC: 4.2 MMOL/L (ref 3.7–5.3)
SODIUM BLD-SCNC: 140 MMOL/L (ref 135–144)
TSH SERPL DL<=0.05 MIU/L-ACNC: 2.3 MIU/L (ref 0.3–5)

## 2018-07-16 PROCEDURE — 1036F TOBACCO NON-USER: CPT | Performed by: PEDIATRICS

## 2018-07-16 PROCEDURE — 1123F ACP DISCUSS/DSCN MKR DOCD: CPT | Performed by: PEDIATRICS

## 2018-07-16 PROCEDURE — G8427 DOCREV CUR MEDS BY ELIG CLIN: HCPCS | Performed by: PEDIATRICS

## 2018-07-16 PROCEDURE — 99214 OFFICE O/P EST MOD 30 MIN: CPT | Performed by: PEDIATRICS

## 2018-07-16 PROCEDURE — G8598 ASA/ANTIPLAT THER USED: HCPCS | Performed by: PEDIATRICS

## 2018-07-16 PROCEDURE — G8419 CALC BMI OUT NRM PARAM NOF/U: HCPCS | Performed by: PEDIATRICS

## 2018-07-16 PROCEDURE — 4040F PNEUMOC VAC/ADMIN/RCVD: CPT | Performed by: PEDIATRICS

## 2018-07-16 PROCEDURE — 1101F PT FALLS ASSESS-DOCD LE1/YR: CPT | Performed by: PEDIATRICS

## 2018-07-16 ASSESSMENT — ENCOUNTER SYMPTOMS
EYE REDNESS: 0
COUGH: 0
SHORTNESS OF BREATH: 0
CONSTIPATION: 0
DIARRHEA: 0
WHEEZING: 0
EYE DISCHARGE: 0

## 2018-07-16 NOTE — PROGRESS NOTES
01/31/18 113/68     Pulse Readings from Last 3 Encounters:   07/16/18 68   02/14/18 83   01/31/18 77       Fall Risk 1/15/2018 10/14/2016 10/13/2015 7/8/2014   2 or more falls in past year? no no no no   Fall with injury in past year? no no yes no       PHQ-9 Total Score: 0 (1/15/2018 11:11 AM)    Has been doing ok. Regular with medications. Daughter-in-law helps him with medications. Lives 2 houses down from family. Eating ok. Eats more of meal in morning. Daughter-in-law helps some with meals. Breathing is good. No pains. Sleeping is ok, but slow to get to sleep often. No recent illness. No trouble walking. Not doing exercise. feels memory is ok. Knows Monday. July. 2018. Just some issues. Electronically signed by Lillian Velasquez MD on 7/16/2018 at 10:50 AM      Current Outpatient Prescriptions   Medication Sig Dispense Refill    donepezil (ARICEPT) 10 MG tablet Take 1 tablet by mouth nightly 90 tablet 1    dutasteride (AVODART) 0.5 MG capsule Take 1 capsule by mouth daily 90 capsule 1    silodosin (RAPAFLO) 8 MG CAPS Take 1 capsule by mouth every evening 90 capsule 1    apixaban (ELIQUIS) 2.5 MG TABS tablet Take 1 tablet by mouth 2 times daily 180 tablet 1    lovastatin (MEVACOR) 40 MG tablet Take 2 tablets by mouth nightly 180 tablet 1    levothyroxine (SYNTHROID) 75 MCG tablet Take 1 tablet by mouth Daily 90 tablet 3    pantoprazole (PROTONIX) 40 MG tablet Take 1 tablet by mouth daily 90 tablet 1    memantine (NAMENDA) 5 MG tablet Take 1 tablet by mouth 2 times daily 180 tablet 1    potassium citrate (UROCIT-K) 10 MEQ (1080 MG) extended release tablet Take 3 tablets by mouth 2 times daily 540 tablet 1    Calcium-Vitamin D (CALTRATE 600 PLUS-VIT D PO) Take  by mouth.  albuterol (PROVENTIL HFA;VENTOLIN HFA) 108 (90 BASE) MCG/ACT inhaler Inhale 2 puffs into the lungs every 4 hours as needed for Wheezing.        No current facility-administered medications Lymphadenopathy:     He has no cervical adenopathy. Neurological: He is alert and oriented to person, place, and time. He exhibits normal muscle tone. Some trouble with memory recall. Skin: Skin is warm. Mild papular rash on left antecubital area. No others. Skin on back shows chronic sun changes, no acute changes. Psychiatric: He has a normal mood and affect. His behavior is normal.       Assessment:       Diagnosis Orders   1. Hypothyroidism, unspecified type  TSH   2. Thoracic aortic atherosclerosis ([facility] [address]     3. Late onset Alzheimer's disease without behavioral disturbance   Stable to possible slowly worse memory issues. Continue meds and monitor. 4. Essential hypertension     5. Chronic kidney disease, stage 3  Basic Metabolic Panel       Quality & Risk Score Accuracy    Visit Dx:  I70.0 - [facility] ([facility]  Assessment and plan:  Stable based upon symptoms and exam. Continue current treatment plan and follow up at least yearly. Visit Dx:  G30.1, F02.80 - Late onset Alzheimer's disease without behavioral disturbance  Assessment and plan:  Stable or mild progression based upon symptoms and exam. Continue current treatment plan and follow up at least yearly. Follow up with specialist as planned  Last edited 07/16/18 10:59 EDT by Amalia Estevez MD               Plan:      Continue current medications  Continue with healthy diet  Assistant at home as needed  Follow-up with specialist as planned  Check kidney function and thyroid level today  Flu shot in fall time  Ensure safe environment at home      1. Jeremy received counseling on the following healthy behaviors: nutrition and medication adherence  2. Reviewed prior labs and health maintenance  3. Continue current medications, diet and exercise. 4.  Discussed use, benefit, and side effects of prescribed medications. Barriers to medication compliance addressed. All patient questions answered.   Pt voiced

## 2018-09-24 ENCOUNTER — OFFICE VISIT (OUTPATIENT)
Dept: FAMILY MEDICINE CLINIC | Age: 83
End: 2018-09-24
Payer: MEDICARE

## 2018-09-24 VITALS
HEART RATE: 84 BPM | WEIGHT: 166 LBS | RESPIRATION RATE: 16 BRPM | TEMPERATURE: 98.2 F | BODY MASS INDEX: 26.79 KG/M2 | DIASTOLIC BLOOD PRESSURE: 72 MMHG | SYSTOLIC BLOOD PRESSURE: 130 MMHG

## 2018-09-24 DIAGNOSIS — L82.1 SEBORRHEIC KERATOSES: ICD-10-CM

## 2018-09-24 DIAGNOSIS — L03.116 CELLULITIS OF LEFT FOOT: Primary | ICD-10-CM

## 2018-09-24 PROCEDURE — G8427 DOCREV CUR MEDS BY ELIG CLIN: HCPCS | Performed by: PEDIATRICS

## 2018-09-24 PROCEDURE — 4040F PNEUMOC VAC/ADMIN/RCVD: CPT | Performed by: PEDIATRICS

## 2018-09-24 PROCEDURE — 1123F ACP DISCUSS/DSCN MKR DOCD: CPT | Performed by: PEDIATRICS

## 2018-09-24 PROCEDURE — 1036F TOBACCO NON-USER: CPT | Performed by: PEDIATRICS

## 2018-09-24 PROCEDURE — 1101F PT FALLS ASSESS-DOCD LE1/YR: CPT | Performed by: PEDIATRICS

## 2018-09-24 PROCEDURE — G8598 ASA/ANTIPLAT THER USED: HCPCS | Performed by: PEDIATRICS

## 2018-09-24 PROCEDURE — G8419 CALC BMI OUT NRM PARAM NOF/U: HCPCS | Performed by: PEDIATRICS

## 2018-09-24 PROCEDURE — 99213 OFFICE O/P EST LOW 20 MIN: CPT | Performed by: PEDIATRICS

## 2018-09-24 RX ORDER — CEPHALEXIN 500 MG/1
500 CAPSULE ORAL 3 TIMES DAILY
Qty: 21 CAPSULE | Refills: 0 | Status: SHIPPED | OUTPATIENT
Start: 2018-09-24 | End: 2019-05-17 | Stop reason: SDUPTHER

## 2018-09-24 ASSESSMENT — ENCOUNTER SYMPTOMS
COUGH: 0
SHORTNESS OF BREATH: 0

## 2018-09-24 NOTE — PATIENT INSTRUCTIONS
Today's office visit was for the following diagnosis    ICD-10-CM ICD-9-CM    1. Cellulitis of left foot L03.116 682.7 cephALEXin (KEFLEX) 500 MG capsule   2. Seborrheic keratoses L82.1 702.19        The treatment plan consists of the following     Antibiotic for left foot cellulitis  Elevate  Tylenol as needed for pain  Repeat evaluation in 2 days  Continue other medications        All Future Testing planned in [facility]  Lab Frequency Next Occurrence       Survey of office experience to help improve our quality of service. Please note that you may receive a patient satisfaction survey either by [employer] [facility] postal mail service or email. We would appreciate you taking the time to complete and return the survey. We value your opinion and will use your comments to help improve our care and  service to our patients    Health Maintenance  Please also note the list of Health maintenance items for you and their due dates. Help us continue to provide excellent health care by keeping these items up to date. We will be happy to assist you in getting this completed in a timely fashion. Health Maintenance Due   Topic Date Due    Flu vaccine (1) 09/01/2018         After-Hours Urgent [address]  24 hours emergency services daily  [facility].      Patient Education        Cellulitis: Care Instructions  Your Care Instructions    Cellulitis is a skin infection caused by bacteria, most often strep or staph. It often occurs after a break in the skin from a scrape, cut, bite, or puncture, or after a rash. Cellulitis may be treated without doing tests to find out what caused it. But your doctor may do tests, if needed, to look for a specific bacteria, like methicillin-resistant Staphylococcus aureus (MRSA). The doctor has checked you carefully, but problems can develop later. If you notice any problems or new symptoms, get medical treatment right away.   Follow-up care is a key part of your treatment and safety. Be sure to make and go to all appointments, and call your doctor if you are having problems. It's also a good idea to know your test results and keep a list of the medicines you take. How can you care for yourself at home? · Take your antibiotics as directed. Do not stop taking them just because you feel better. You need to take the full course of antibiotics. · Prop up the infected area on pillows to reduce pain and swelling. Try to keep the area above the level of your heart as often as you can. · If your doctor told you how to care for your wound, follow your doctor's instructions. If you did not get instructions, follow this general advice:  ¨ Wash the wound with clean water 2 times a day. Don't use hydrogen peroxide or alcohol, which can slow healing. ¨ You may cover the wound with a thin layer of petroleum jelly, such as Vaseline, and a nonstick bandage. ¨ Apply more petroleum jelly and replace the bandage as needed. · Be safe with medicines. Take pain medicines exactly as directed. ¨ If the doctor gave you a prescription medicine for pain, take it as prescribed. ¨ If you are not taking a prescription pain medicine, ask your doctor if you can take an over-the-counter medicine. To prevent cellulitis in the future  · Try to prevent cuts, scrapes, or other injuries to your skin. Cellulitis most often occurs where there is a break in the skin. · If you get a scrape, cut, mild burn, or bite, wash the wound with clean water as soon as you can to help avoid infection. Don't use hydrogen peroxide or alcohol, which can slow healing. · If you have swelling in your legs (edema), support stockings and good skin care may help prevent leg sores and cellulitis. · Take care of your feet, especially if you have diabetes or other conditions that increase the risk of infection. Wear shoes and socks. Do not go barefoot.  If you have athlete's foot or other skin problems on your feet, talk

## 2018-09-24 NOTE — PROGRESS NOTES
Readings from Last 3 Encounters:   09/24/18 84   07/16/18 68   02/14/18 83       Fall Risk 1/15/2018 10/14/2016 10/13/2015 7/8/2014   2 or more falls in past year? no no no no   Fall with injury in past year? no no yes no       PHQ-9 Total Score: 0 (1/15/2018 11:11 AM)    Left foot pain started early Sunday morning. No know injury. A lot of pain. Was not able to sleep last night due to pain. Pain at rest.  Not increased with walking. No fever. Breathing is good. No other pains. Pain is persisting since it started. Has been eating ok. Has spot on his back he wants looked at. Electronically signed by Nadira Ford MD on 9/24/2018 at 12:03 PM      Current Outpatient Prescriptions   Medication Sig Dispense Refill    donepezil (ARICEPT) 10 MG tablet Take 1 tablet by mouth nightly 90 tablet 1    dutasteride (AVODART) 0.5 MG capsule Take 1 capsule by mouth daily 90 capsule 1    silodosin (RAPAFLO) 8 MG CAPS Take 1 capsule by mouth every evening 90 capsule 1    apixaban (ELIQUIS) 2.5 MG TABS tablet Take 1 tablet by mouth 2 times daily 180 tablet 1    lovastatin (MEVACOR) 40 MG tablet Take 2 tablets by mouth nightly 180 tablet 1    levothyroxine (SYNTHROID) 75 MCG tablet Take 1 tablet by mouth Daily 90 tablet 3    pantoprazole (PROTONIX) 40 MG tablet Take 1 tablet by mouth daily 90 tablet 1    memantine (NAMENDA) 5 MG tablet Take 1 tablet by mouth 2 times daily 180 tablet 1    Calcium-Vitamin D (CALTRATE 600 PLUS-VIT D PO) Take  by mouth.  potassium citrate (UROCIT-K) 10 MEQ (1080 MG) extended release tablet Take 3 tablets by mouth 2 times daily 540 tablet 1     No current facility-administered medications for this visit.         Patient Active Problem List   Diagnosis    BPH (benign prostatic hyperplasia)    Hypertension    Hyperlipidemia    Hypothyroidism    CAD (coronary artery disease)    GERD (gastroesophageal reflux disease)    Alzheimer's dementia    History of DVT questions answered. Pt voiced understanding. 5.  Patient given educational materials - see patient instructions  6. Was a self-tracking handout given in paper form or via [employer]? No  If yes, see orders or list here. Completed Refills   Requested Prescriptions     Signed Prescriptions Disp Refills    cephALEXin (KEFLEX) 500 MG capsule 21 capsule 0     Sig: Take 1 capsule by mouth 3 times daily for 7 days       All patient questions answered. Patient voiced understanding.        PHQ Scores 1/15/2018 10/14/2016 2/27/2015 1/8/2014   PHQ2 Score 0 0 4 0   PHQ9 Score 0 0 16 0     Interpretation of Total Score Depression Severity: 1-4 = Minimal depression, 5-9 = Mild depression, 10-14 = Moderate depression, 15-19 = Moderately severe depression, 20-27 = Severe depression  Normal

## 2018-09-27 ENCOUNTER — OFFICE VISIT (OUTPATIENT)
Dept: FAMILY MEDICINE CLINIC | Age: 83
End: 2018-09-27
Payer: MEDICARE

## 2018-09-27 VITALS
BODY MASS INDEX: 27.44 KG/M2 | SYSTOLIC BLOOD PRESSURE: 130 MMHG | DIASTOLIC BLOOD PRESSURE: 70 MMHG | TEMPERATURE: 97 F | WEIGHT: 170 LBS | RESPIRATION RATE: 16 BRPM | HEART RATE: 80 BPM

## 2018-09-27 DIAGNOSIS — L03.116 CELLULITIS OF LEFT LOWER EXTREMITY: Primary | ICD-10-CM

## 2018-09-27 PROCEDURE — G8598 ASA/ANTIPLAT THER USED: HCPCS | Performed by: NURSE PRACTITIONER

## 2018-09-27 PROCEDURE — 1101F PT FALLS ASSESS-DOCD LE1/YR: CPT | Performed by: NURSE PRACTITIONER

## 2018-09-27 PROCEDURE — 99213 OFFICE O/P EST LOW 20 MIN: CPT | Performed by: NURSE PRACTITIONER

## 2018-09-27 PROCEDURE — 4040F PNEUMOC VAC/ADMIN/RCVD: CPT | Performed by: NURSE PRACTITIONER

## 2018-09-27 PROCEDURE — G8427 DOCREV CUR MEDS BY ELIG CLIN: HCPCS | Performed by: NURSE PRACTITIONER

## 2018-09-27 PROCEDURE — 1123F ACP DISCUSS/DSCN MKR DOCD: CPT | Performed by: NURSE PRACTITIONER

## 2018-09-27 PROCEDURE — 1036F TOBACCO NON-USER: CPT | Performed by: NURSE PRACTITIONER

## 2018-09-27 PROCEDURE — G8419 CALC BMI OUT NRM PARAM NOF/U: HCPCS | Performed by: NURSE PRACTITIONER

## 2018-10-07 ASSESSMENT — ENCOUNTER SYMPTOMS
ABDOMINAL PAIN: 0
VOMITING: 0
SHORTNESS OF BREATH: 0
WHEEZING: 0
COUGH: 0
SORE THROAT: 0
NAUSEA: 0

## 2018-12-21 DIAGNOSIS — G30.1 LATE ONSET ALZHEIMER'S DISEASE WITHOUT BEHAVIORAL DISTURBANCE (HCC): ICD-10-CM

## 2018-12-21 DIAGNOSIS — E78.2 MIXED HYPERLIPIDEMIA: ICD-10-CM

## 2018-12-21 DIAGNOSIS — I82.501 CHRONIC DEEP VEIN THROMBOSIS (DVT) OF RIGHT LOWER EXTREMITY, UNSPECIFIED VEIN (HCC): ICD-10-CM

## 2018-12-21 DIAGNOSIS — F02.80 LATE ONSET ALZHEIMER'S DISEASE WITHOUT BEHAVIORAL DISTURBANCE (HCC): ICD-10-CM

## 2018-12-21 RX ORDER — MEMANTINE HYDROCHLORIDE 5 MG/1
5 TABLET ORAL 2 TIMES DAILY
Qty: 180 TABLET | Refills: 0 | Status: SHIPPED | OUTPATIENT
Start: 2018-12-21 | End: 2019-03-18 | Stop reason: SDUPTHER

## 2018-12-21 RX ORDER — DUTASTERIDE 0.5 MG/1
0.5 CAPSULE, LIQUID FILLED ORAL DAILY
Qty: 90 CAPSULE | Refills: 0 | Status: SHIPPED | OUTPATIENT
Start: 2018-12-21 | End: 2019-03-18 | Stop reason: SDUPTHER

## 2018-12-21 RX ORDER — PANTOPRAZOLE SODIUM 40 MG/1
40 TABLET, DELAYED RELEASE ORAL DAILY
Qty: 90 TABLET | Refills: 0 | Status: SHIPPED | OUTPATIENT
Start: 2018-12-21 | End: 2019-03-18 | Stop reason: SDUPTHER

## 2018-12-21 RX ORDER — LOVASTATIN 40 MG/1
80 TABLET ORAL NIGHTLY
Qty: 180 TABLET | Refills: 0 | Status: SHIPPED | OUTPATIENT
Start: 2018-12-21 | End: 2019-03-18 | Stop reason: SDUPTHER

## 2018-12-21 NOTE — TELEPHONE ENCOUNTER
LOV:7-16-18  ROV:4 months called patient unable to leave message   LRF:6-25-18  Health Maintenance   Topic Date Due    Shingles Vaccine (1 of 2 - 2 Dose Series) 03/05/1976    Flu vaccine (1) 09/01/2018    DTaP/Tdap/Td vaccine (1 - Tdap) 01/02/2019 (Originally 3/5/1945)    TSH testing  07/16/2019    Potassium monitoring  07/16/2019    Creatinine monitoring  07/16/2019    Pneumococcal low/med risk  Completed             (applicable per patient's age: Cancer Screenings, Depression Screening, Fall Risk Screening, Immunizations)    Hemoglobin A1C (%)   Date Value   10/15/2015 5.1   05/21/2013 5.6     LDL Cholesterol (mg/dL)   Date Value   01/23/2018 88     AST (U/L)   Date Value   01/23/2018 16     ALT (U/L)   Date Value   01/23/2018 10     BUN (mg/dL)   Date Value   07/16/2018 16      (goal A1C is < 7)   (goal LDL is <100) need 30-50% reduction from baseline     BP Readings from Last 3 Encounters:   09/27/18 130/70   09/24/18 130/72   07/16/18 122/70    (goal /80)      All Future Testing planned in [facility]:  Lab Frequency Next Occurrence       Next Visit Date:  No future appointments.          Patient Active Problem List:     BPH (benign prostatic hyperplasia)     Hypertension     Hyperlipidemia     Hypothyroidism     CAD (coronary artery disease)     GERD (gastroesophageal reflux disease)     Alzheimer's dementia     History of DVT of lower extremity     Chronic kidney disease, stage 3 ([address]     Thoracic aortic atherosclerosis ([address]

## 2019-01-17 DIAGNOSIS — G30.1 LATE ONSET ALZHEIMER'S DISEASE WITHOUT BEHAVIORAL DISTURBANCE (HCC): ICD-10-CM

## 2019-01-17 DIAGNOSIS — F02.80 LATE ONSET ALZHEIMER'S DISEASE WITHOUT BEHAVIORAL DISTURBANCE (HCC): ICD-10-CM

## 2019-01-18 RX ORDER — DONEPEZIL HYDROCHLORIDE 10 MG/1
TABLET, FILM COATED ORAL
Qty: 90 TABLET | Refills: 1 | Status: SHIPPED | OUTPATIENT
Start: 2019-01-18 | End: 2019-07-09 | Stop reason: SDUPTHER

## 2019-02-01 PROBLEM — H35.3231 EXUDATIVE AGE-RELATED MACULAR DEGENERATION OF BOTH EYES WITH ACTIVE CHOROIDAL NEOVASCULARIZATION (HCC): Status: ACTIVE | Noted: 2019-02-01

## 2019-02-01 PROBLEM — H04.123 DRY EYES: Status: ACTIVE | Noted: 2019-02-01

## 2019-03-18 DIAGNOSIS — F02.80 LATE ONSET ALZHEIMER'S DISEASE WITHOUT BEHAVIORAL DISTURBANCE (HCC): ICD-10-CM

## 2019-03-18 DIAGNOSIS — G30.1 LATE ONSET ALZHEIMER'S DISEASE WITHOUT BEHAVIORAL DISTURBANCE (HCC): ICD-10-CM

## 2019-03-18 DIAGNOSIS — I82.501 CHRONIC DEEP VEIN THROMBOSIS (DVT) OF RIGHT LOWER EXTREMITY, UNSPECIFIED VEIN (HCC): ICD-10-CM

## 2019-03-18 DIAGNOSIS — N40.0 BENIGN NON-NODULAR PROSTATIC HYPERPLASIA WITHOUT LOWER URINARY TRACT SYMPTOMS: ICD-10-CM

## 2019-03-18 DIAGNOSIS — E78.2 MIXED HYPERLIPIDEMIA: ICD-10-CM

## 2019-03-18 RX ORDER — DUTASTERIDE 0.5 MG/1
0.5 CAPSULE, LIQUID FILLED ORAL DAILY
Qty: 90 CAPSULE | Refills: 0 | Status: SHIPPED | OUTPATIENT
Start: 2019-03-18 | End: 2019-07-02 | Stop reason: SDUPTHER

## 2019-03-18 RX ORDER — MEMANTINE HYDROCHLORIDE 5 MG/1
5 TABLET ORAL 2 TIMES DAILY
Qty: 180 TABLET | Refills: 0 | Status: SHIPPED | OUTPATIENT
Start: 2019-03-18 | End: 2019-06-17 | Stop reason: SDUPTHER

## 2019-03-18 RX ORDER — PANTOPRAZOLE SODIUM 40 MG/1
40 TABLET, DELAYED RELEASE ORAL DAILY
Qty: 90 TABLET | Refills: 0 | Status: SHIPPED | OUTPATIENT
Start: 2019-03-18 | End: 2019-06-17 | Stop reason: SDUPTHER

## 2019-03-18 RX ORDER — LOVASTATIN 40 MG/1
80 TABLET ORAL NIGHTLY
Qty: 180 TABLET | Refills: 0 | Status: SHIPPED | OUTPATIENT
Start: 2019-03-18 | End: 2019-06-17 | Stop reason: SDUPTHER

## 2019-03-18 RX ORDER — SILODOSIN 8 MG/1
8 CAPSULE ORAL EVERY EVENING
Qty: 90 CAPSULE | Refills: 0 | Status: SHIPPED | OUTPATIENT
Start: 2019-03-18 | End: 2019-06-17 | Stop reason: SDUPTHER

## 2019-05-03 ENCOUNTER — HOSPITAL ENCOUNTER (OUTPATIENT)
Age: 84
Setting detail: SPECIMEN
Discharge: HOME | End: 2019-05-03
Payer: MEDICARE

## 2019-05-03 ENCOUNTER — OFFICE VISIT (OUTPATIENT)
Dept: FAMILY MEDICINE CLINIC | Age: 84
End: 2019-05-03
Payer: MEDICARE

## 2019-05-03 VITALS
HEART RATE: 50 BPM | RESPIRATION RATE: 16 BRPM | TEMPERATURE: 97.1 F | SYSTOLIC BLOOD PRESSURE: 130 MMHG | WEIGHT: 176 LBS | DIASTOLIC BLOOD PRESSURE: 76 MMHG | BODY MASS INDEX: 28.41 KG/M2

## 2019-05-03 DIAGNOSIS — E03.9 HYPOTHYROIDISM, UNSPECIFIED TYPE: ICD-10-CM

## 2019-05-03 DIAGNOSIS — E78.2 MIXED HYPERLIPIDEMIA: ICD-10-CM

## 2019-05-03 DIAGNOSIS — F02.80 LATE ONSET ALZHEIMER'S DISEASE WITHOUT BEHAVIORAL DISTURBANCE (HCC): ICD-10-CM

## 2019-05-03 DIAGNOSIS — I10 ESSENTIAL HYPERTENSION: ICD-10-CM

## 2019-05-03 DIAGNOSIS — I70.0 THORACIC AORTIC ATHEROSCLEROSIS (HCC): ICD-10-CM

## 2019-05-03 DIAGNOSIS — H35.3231 EXUDATIVE AGE-RELATED MACULAR DEGENERATION OF BOTH EYES WITH ACTIVE CHOROIDAL NEOVASCULARIZATION (HCC): ICD-10-CM

## 2019-05-03 DIAGNOSIS — G30.1 LATE ONSET ALZHEIMER'S DISEASE WITHOUT BEHAVIORAL DISTURBANCE (HCC): ICD-10-CM

## 2019-05-03 DIAGNOSIS — I10 ESSENTIAL HYPERTENSION: Primary | ICD-10-CM

## 2019-05-03 LAB
ALBUMIN SERPL-MCNC: 4 G/DL (ref 3.5–5.2)
ALBUMIN/GLOBULIN RATIO: 1.6 (ref 1–2.5)
ALP BLD-CCNC: 95 U/L (ref 40–129)
ALT SERPL-CCNC: 9 U/L (ref 5–41)
ANION GAP SERPL CALCULATED.3IONS-SCNC: 15 MMOL/L (ref 9–17)
AST SERPL-CCNC: 15 U/L
BILIRUB SERPL-MCNC: 0.43 MG/DL (ref 0.3–1.2)
BUN BLDV-MCNC: 15 MG/DL (ref 8–23)
BUN/CREAT BLD: NORMAL (ref 9–20)
CALCIUM SERPL-MCNC: 10.3 MG/DL (ref 8.6–10.4)
CHLORIDE BLD-SCNC: 106 MMOL/L (ref 98–107)
CHOLESTEROL/HDL RATIO: 3
CHOLESTEROL: 142 MG/DL
CO2: 21 MMOL/L (ref 20–31)
CREAT SERPL-MCNC: 1.02 MG/DL (ref 0.7–1.2)
GFR AFRICAN AMERICAN: >60 ML/MIN
GFR NON-AFRICAN AMERICAN: >60 ML/MIN
GFR SERPL CREATININE-BSD FRML MDRD: NORMAL ML/MIN/{1.73_M2}
GFR SERPL CREATININE-BSD FRML MDRD: NORMAL ML/MIN/{1.73_M2}
GLUCOSE FASTING: 75 MG/DL (ref 70–99)
HCT VFR BLD CALC: 47.9 % (ref 40.7–50.3)
HDLC SERPL-MCNC: 48 MG/DL
HEMOGLOBIN: 15.1 G/DL (ref 13–17)
LDL CHOLESTEROL: 57 MG/DL (ref 0–130)
MCH RBC QN AUTO: 30.4 PG (ref 25.2–33.5)
MCHC RBC AUTO-ENTMCNC: 31.5 G/DL (ref 28.4–34.8)
MCV RBC AUTO: 96.4 FL (ref 82.6–102.9)
NRBC AUTOMATED: 0 PER 100 WBC
PDW BLD-RTO: 14.3 % (ref 11.8–14.4)
PLATELET # BLD: 235 K/UL (ref 138–453)
PMV BLD AUTO: 11.1 FL (ref 8.1–13.5)
POTASSIUM SERPL-SCNC: 4.4 MMOL/L (ref 3.7–5.3)
RBC # BLD: 4.97 M/UL (ref 4.21–5.77)
SODIUM BLD-SCNC: 142 MMOL/L (ref 135–144)
TOTAL PROTEIN: 6.5 G/DL (ref 6.4–8.3)
TRIGL SERPL-MCNC: 183 MG/DL
TSH SERPL DL<=0.05 MIU/L-ACNC: 4.45 MIU/L (ref 0.3–5)
VLDLC SERPL CALC-MCNC: ABNORMAL MG/DL (ref 1–30)
WBC # BLD: 8.9 K/UL (ref 3.5–11.3)

## 2019-05-03 PROCEDURE — 4040F PNEUMOC VAC/ADMIN/RCVD: CPT | Performed by: NURSE PRACTITIONER

## 2019-05-03 PROCEDURE — 1036F TOBACCO NON-USER: CPT | Performed by: NURSE PRACTITIONER

## 2019-05-03 PROCEDURE — 1123F ACP DISCUSS/DSCN MKR DOCD: CPT | Performed by: NURSE PRACTITIONER

## 2019-05-03 PROCEDURE — G8598 ASA/ANTIPLAT THER USED: HCPCS | Performed by: NURSE PRACTITIONER

## 2019-05-03 PROCEDURE — G8427 DOCREV CUR MEDS BY ELIG CLIN: HCPCS | Performed by: NURSE PRACTITIONER

## 2019-05-03 PROCEDURE — 99214 OFFICE O/P EST MOD 30 MIN: CPT | Performed by: NURSE PRACTITIONER

## 2019-05-03 PROCEDURE — G8419 CALC BMI OUT NRM PARAM NOF/U: HCPCS | Performed by: NURSE PRACTITIONER

## 2019-05-03 ASSESSMENT — PATIENT HEALTH QUESTIONNAIRE - PHQ9
1. LITTLE INTEREST OR PLEASURE IN DOING THINGS: 0
SUM OF ALL RESPONSES TO PHQ QUESTIONS 1-9: 0
SUM OF ALL RESPONSES TO PHQ QUESTIONS 1-9: 0
2. FEELING DOWN, DEPRESSED OR HOPELESS: 0
SUM OF ALL RESPONSES TO PHQ9 QUESTIONS 1 & 2: 0

## 2019-05-03 NOTE — PATIENT INSTRUCTIONS

## 2019-05-03 NOTE — PROGRESS NOTES
Subjective:      Patient ID: Rin Nolen is a 80 y.o. male. Visit Information    Have you changed or started any medications since your last visit including any over-the-counter medicines, vitamins, or herbal medicines? no   Are you having any side effects from any of your medications? -  no  Have you stopped taking any of your medications? Is so, why? -  no    Have you seen any other physician or provider since your last visit? No  Have you had any other diagnostic tests since your last visit? No  Have you been seen in the emergency room and/or had an admission to a hospital since we last saw you? No  Have you had your routine dental cleaning in the past 6 months? no    Have you activated your [employer] account? If not, what are your barriers?  Yes     Patient Care Team:  Loren Lindsey MD as PCP - Brenda Mora MD as PCP - [facility] Attributed Provider    Medical History Review  Past Medical, Family, and Social History reviewed and does contribute to the patient presenting condition    Health Maintenance   Topic Date Due    DTaP/Tdap/Td vaccine (1 - Tdap) 03/05/1945    Shingles Vaccine (1 of 2) 03/05/1976    Flu vaccine (Season Ended) 09/01/2019    TSH testing  05/03/2020    Potassium monitoring  05/03/2020    Creatinine monitoring  05/03/2020    Pneumococcal 65+ years Vaccine  Completed     /76   Pulse 50   Temp 97.1 °F (36.2 °C) (Tympanic)   Resp 16   Wt 176 lb (79.8 kg)   BMI 28.41 kg/m²      PHQ Scores 5/3/2019 1/15/2018 10/14/2016 2/27/2015 1/8/2014   PHQ2 Score 0 0 0 4 0   PHQ9 Score 0 0 0 16 0     Interpretation of Total Score DepressionSeverity: 1-4 = Minimal depression, 5-9 = Mild depression, 10-14 = Moderate depression, 15-19 = Moderately severe depression, 20-27 = Severe depression    Current Outpatient Medications   Medication Sig Dispense Refill    memantine (NAMENDA) 5 MG tablet Take 1 tablet by mouth 2 times daily 180 tablet 0    lovastatin (MEVACOR) 40 MG tablet Take 2 tablets by mouth nightly 180 tablet 0    apixaban (ELIQUIS) 2.5 MG TABS tablet Take 1 tablet by mouth 2 times daily 180 tablet 0    silodosin (RAPAFLO) 8 MG CAPS Take 1 capsule by mouth every evening 90 capsule 0    pantoprazole (PROTONIX) 40 MG tablet Take 1 tablet by mouth daily 90 tablet 0    dutasteride (AVODART) 0.5 MG capsule Take 1 capsule by mouth daily 90 capsule 0    donepezil (ARICEPT) 10 MG tablet TAKE 1 TABLET NIGHTLY 90 tablet 1    levothyroxine (SYNTHROID) 75 MCG tablet Take 1 tablet by mouth Daily 90 tablet 3    Calcium-Vitamin D (CALTRATE 600 PLUS-VIT D PO) Take  by mouth.  potassium citrate (UROCIT-K) 10 MEQ (1080 MG) extended release tablet Take 3 tablets by mouth 2 times daily 540 tablet 1     No current facility-administered medications for this visit. HPI    Patient presents to the office with son for follow-up of hypertension, hyperlipidemia, vascular disease, Alzheimer's. Overall doing well. Taking and tolerating medications without any issues. Memory issues remained stable. No behavioral issues. Due for fasting labs. Due to have a repeat thyroid studies. Denies any concerns today. He is alert, appropriately interactive. Appears to be well-hydrated, no acute distress. Somewhat limited historian due to Alzheimer's. [facility]    Review of Systems   Constitutional: Negative for appetite change, chills, fatigue, fever and unexpected weight change. ROS limited due to hearing    HENT: Negative for congestion and sore throat. Eyes: Positive for visual disturbance (macular degeneration-follows with ophthalmology). Respiratory: Negative for cough, chest tightness, shortness of breath and wheezing. Cardiovascular: Negative for chest pain and palpitations. Gastrointestinal: Negative for abdominal distention, abdominal pain, nausea and vomiting. Genitourinary: Negative for dysuria, frequency, hematuria and urgency.    Musculoskeletal: Negative for back pain, gait problem, myalgias and neck stiffness. Skin: Negative for rash and wound. Allergic/Immunologic: Negative for environmental allergies and food allergies. Neurological: Negative for seizures, syncope, speech difficulty, weakness, numbness and headaches. Alzheimer's stable   Hematological: Negative for adenopathy. Psychiatric/Behavioral: Negative for dysphoric mood, self-injury and suicidal ideas. The patient is not nervous/anxious. Objective:   Physical Exam   Constitutional: He is oriented to person, place, and time. He appears well-developed. No distress. Overweight    HENT:   Head: Normocephalic and atraumatic. Eyes: Conjunctivae are normal.   Neck: No thyromegaly present. Cardiovascular: Normal rate and regular rhythm. Exam reveals distant heart sounds. Pulses:       Carotid pulses are 2+ on the right side, and 2+ on the left side. Radial pulses are 2+ on the right side, and 2+ on the left side. Posterior tibial pulses are 2+ on the right side, and 2+ on the left side. Pulmonary/Chest: Effort normal and breath sounds normal. No respiratory distress. He has no wheezes. Abdominal: Soft. He exhibits no distension. Protuberant    Musculoskeletal: He exhibits no edema. Diffuse DJD. Lymphadenopathy:     He has no cervical adenopathy. Neurological: He is alert and oriented to person, place, and time. He exhibits normal muscle tone. Skin: Skin is warm. Psychiatric: He has a normal mood and affect. His behavior is normal.       Assessment / Plan:     1. Mixed hyperlipidemia  Stable, continue current medications, continue monitoring  - CBC; Future  - Lipid Panel; Future  - Comprehensive Metabolic Panel, Fasting; Future    2. Hypothyroidism, unspecified type  Stable, continue current medication, continue monitoring. Proceed with labs for evaluation.  - CBC; Future  - Lipid Panel; Future  - Comprehensive Metabolic Panel, Fasting;  Future  - TSH with Reflex; Future    3. Essential hypertension  Controlled, continue current medications, continue monitoring.  - CBC; Future  - Lipid Panel; Future  - Comprehensive Metabolic Panel, Fasting; Future    4. Thoracic aortic atherosclerosis (HCC)  Asymptomatic, continue current medication, continue monitoring. Maintain control blood pressure and cholesterol levels. 5. Exudative age-related macular degeneration of both eyes with active choroidal neovascularization (HCC)  Stable, managed per ophthalmology. Continue monitoring and maintain follow-up appointments as planned. 6. Late onset Alzheimer's disease without behavioral disturbance  Stable, continue current medications, continue monitoring. Maintain follow up with specialists as planned. Return in about 3 months (around 8/3/2019), or if symptoms worsen or fail to improve, for Routine follow up. Benjamin Leaver received counseling on the following healthy behaviors: nutrition, exercise and medication adherence  Reviewed prior labs and health maintenance  Continue current medications, diet and exercise. Discussed use, benefit, and side effects of prescribed medications. Barriers to medication compliance addressed. Patient given educational materials - see patient instructions  Was a self-tracking handout given in paper form or via [employer]? No:     Requested Prescriptions      No prescriptions requested or ordered in this encounter       All patient questions answered. Patient voiced understanding. Quality Measures    Body mass index is 28.41 kg/m². Elevated. Weight control planned discussed Healthy diet and regular exercise. BP: 130/76. Blood pressure is normal. Treatment plan consists of No treatment change needed. Fall Risk 5/3/2019 1/15/2018 10/14/2016 10/13/2015 7/8/2014   2 or more falls in past year? no no no no no   Fall with injury in past year? no no no yes no     The patient does not have a history of falls.  I did , complete a risk assessment for

## 2019-05-12 ASSESSMENT — ENCOUNTER SYMPTOMS
ABDOMINAL DISTENTION: 0
NAUSEA: 0
BACK PAIN: 0
WHEEZING: 0
VOMITING: 0
CHEST TIGHTNESS: 0
COUGH: 0
SHORTNESS OF BREATH: 0
SORE THROAT: 0
ABDOMINAL PAIN: 0

## 2019-05-17 ENCOUNTER — OFFICE VISIT (OUTPATIENT)
Dept: FAMILY MEDICINE CLINIC | Age: 84
End: 2019-05-17
Payer: MEDICARE

## 2019-05-17 VITALS
HEART RATE: 64 BPM | WEIGHT: 174 LBS | BODY MASS INDEX: 28.08 KG/M2 | TEMPERATURE: 98.6 F | SYSTOLIC BLOOD PRESSURE: 120 MMHG | DIASTOLIC BLOOD PRESSURE: 86 MMHG

## 2019-05-17 DIAGNOSIS — L03.116 CELLULITIS OF LEFT FOOT: Primary | ICD-10-CM

## 2019-05-17 PROCEDURE — G8427 DOCREV CUR MEDS BY ELIG CLIN: HCPCS | Performed by: PEDIATRICS

## 2019-05-17 PROCEDURE — 1036F TOBACCO NON-USER: CPT | Performed by: PEDIATRICS

## 2019-05-17 PROCEDURE — 99213 OFFICE O/P EST LOW 20 MIN: CPT | Performed by: PEDIATRICS

## 2019-05-17 PROCEDURE — 1123F ACP DISCUSS/DSCN MKR DOCD: CPT | Performed by: PEDIATRICS

## 2019-05-17 PROCEDURE — 4040F PNEUMOC VAC/ADMIN/RCVD: CPT | Performed by: PEDIATRICS

## 2019-05-17 PROCEDURE — G8419 CALC BMI OUT NRM PARAM NOF/U: HCPCS | Performed by: PEDIATRICS

## 2019-05-17 PROCEDURE — G8598 ASA/ANTIPLAT THER USED: HCPCS | Performed by: PEDIATRICS

## 2019-05-17 RX ORDER — CEPHALEXIN 500 MG/1
500 CAPSULE ORAL 3 TIMES DAILY
Qty: 30 CAPSULE | Refills: 0 | Status: SHIPPED | OUTPATIENT
Start: 2019-05-17 | End: 2020-03-03 | Stop reason: SDUPTHER

## 2019-05-17 ASSESSMENT — ENCOUNTER SYMPTOMS
SHORTNESS OF BREATH: 0
COUGH: 0
NAUSEA: 0
VOMITING: 0
CHANGE IN BOWEL HABIT: 0
ABDOMINAL PAIN: 0
SORE THROAT: 0
VISUAL CHANGE: 0
SWOLLEN GLANDS: 0

## 2019-05-17 NOTE — PROGRESS NOTES
Subjective:      Patient ID: Fernando Galindo is a 80 y.o. male. Visit Information    Have you changed or started any medications since your last visit including any over-the-counter medicines, vitamins, or herbal medicines? no   Are you having any side effects from any of your medications? -  no  Have you stopped taking any of your medications? Is so, why? -  no    Have you seen any other physician or provider since your last visit? No  Have you had any other diagnostic tests since your last visit? No  Have you been seen in the emergency room and/or had an admission to a hospital since we last saw you? No  Have you had your routine dental cleaning in the past 6 months? no    Have you activated your Parsely account? If not, what are your barriers?  Yes     Patient Care Team:  YEVGENIY Nolasco CNP as PCP - General (Family Nurse Practitioner)  Luz Maria Carrera MD as PCP - [facility] Attributed Provider    Medical History Review  Past Medical, Family, and Social History reviewed and does contribute to the patient presenting condition    Health Maintenance   Topic Date Due    DTaP/Tdap/Td vaccine (1 - Tdap) 03/05/1945    Shingles Vaccine (1 of 2) 03/05/1976    Flu vaccine (Season Ended) 09/01/2019    TSH testing  05/03/2020    Potassium monitoring  05/03/2020    Creatinine monitoring  05/03/2020    Pneumococcal 65+ years Vaccine  Completed       PHQ Scores 5/3/2019 1/15/2018 10/14/2016 2/27/2015 1/8/2014   PHQ2 Score 0 0 0 4 0   PHQ9 Score 0 0 0 16 0     Interpretation of Total Score DepressionSeverity: 1-4 = Minimal depression, 5-9 = Mild depression, 10-14 = Moderate depression, 15-19 = Moderately severe depression, 20-27 = Severe depression    Current Outpatient Medications   Medication Sig Dispense Refill    cephALEXin (KEFLEX) 500 MG capsule Take 1 capsule by mouth 3 times daily for 10 days 30 capsule 0    memantine (NAMENDA) 5 MG tablet Take 1 tablet by mouth 2 times daily 180 tablet 0    lovastatin (MEVACOR) 40 MG tablet Take 2 tablets by mouth nightly 180 tablet 0    apixaban (ELIQUIS) 2.5 MG TABS tablet Take 1 tablet by mouth 2 times daily 180 tablet 0    silodosin (RAPAFLO) 8 MG CAPS Take 1 capsule by mouth every evening 90 capsule 0    pantoprazole (PROTONIX) 40 MG tablet Take 1 tablet by mouth daily 90 tablet 0    dutasteride (AVODART) 0.5 MG capsule Take 1 capsule by mouth daily 90 capsule 0    donepezil (ARICEPT) 10 MG tablet TAKE 1 TABLET NIGHTLY 90 tablet 1    levothyroxine (SYNTHROID) 75 MCG tablet Take 1 tablet by mouth Daily 90 tablet 3    Calcium-Vitamin D (CALTRATE 600 PLUS-VIT D PO) Take  by mouth.  potassium citrate (UROCIT-K) 10 MEQ (1080 MG) extended release tablet Take 3 tablets by mouth 2 times daily 540 tablet 1     No current facility-administered medications for this visit. HPI:      Patient presents today for evaluation of left foot pain that started yesterday. He has no known injury to the foot. He is in a lot of pain and it was difficult for him to sleep last night because of the pain. He has noticed some mild redness over the left foot. He has not had any fevers. He denies any calf pain, shortness of breath or chest pain. He has been taking some Tylenol which has not been very helpful. cmw      Foot Pain   This is a new problem. The current episode started in the past 7 days. The problem occurs constantly. The problem has been unchanged. Pertinent negatives include no abdominal pain, anorexia, arthralgias, change in bowel habit, chest pain, chills, congestion, coughing, diaphoresis, fatigue, fever, headaches, joint swelling, myalgias, nausea, neck pain, numbness, rash, sore throat, swollen glands, urinary symptoms, vertigo, visual change, vomiting or weakness. The symptoms are aggravated by walking and standing. He has tried acetaminophen for the symptoms. The treatment provided no relief.        Review of Systems   Constitutional: Negative for chills, diaphoresis, fatigue and fever. Difficult to obtain review of symptoms due to decreased hearing   HENT: Negative for congestion and sore throat. Respiratory: Negative for cough and shortness of breath. Cardiovascular: Negative for chest pain and leg swelling. Gastrointestinal: Negative for abdominal pain, anorexia, change in bowel habit, nausea and vomiting. Musculoskeletal: Negative for arthralgias, joint swelling, myalgias and neck pain. Left foot pain. Skin: Negative for rash. Neurological: Negative for vertigo, weakness, numbness and headaches. Objective:     /86   Pulse 64   Temp 98.6 °F (37 °C) (Tympanic)   Wt 174 lb (78.9 kg)   BMI 28.08 kg/m²        Physical Exam   Constitutional: He appears well-developed. Cardiovascular:   Pulses:       Popliteal pulses are 2+ on the left side. Dorsalis pedis pulses are 1+ on the left side. Posterior tibial pulses are 2+ on the left side. Neurological: He is alert. Skin: There is erythema. There is a over the forefoot and midfoot that is slightly erythematous and slightly swollen. This is quite tender to touch. This is somewhat warm to touch. There are no open sores or wounds that are noted. There is no tenderness over the bony structures. This does appear consistent with cellulitis that he has had previously. Psychiatric: He has a normal mood and affect. Assessment:      Diagnosis Orders   1. Cellulitis of left foot  cephALEXin (KEFLEX) 500 MG capsule       Plan:     Start Keflex as prescribed  Recommend elevation of left foot when able  Tylenol as needed for pain  Call with concerns or worsening symptoms      Nova Guerrero received counseling on the following healthy behaviors: nutrition, exercise and medication adherence  Reviewed prior labs and health maintenance. Continue current medications, diet and exercise. Discussed use, benefit, and side effects of prescribed medications.  Barriers to medication compliance addressed. Patient given educational materials - see patient instructions. All patient questions answered. Patient voiced understanding.        Electronically signed by Rip Ansari MD on 5/17/2019 at 12:45 PM

## 2019-06-10 ENCOUNTER — TELEPHONE (OUTPATIENT)
Dept: FAMILY MEDICINE CLINIC | Age: 84
End: 2019-06-10

## 2019-06-10 NOTE — LETTER
[address] [address]  Phone: [phone]  Fax: [phone]    Charmayne Sieving, APRN - CNP        Carol Ann 10, 2019      Dear Dr. Lisseth ORTEGA [birth year] is a patient currently under my care and is on blood thinners, as requested I have reviewed his medical chart. Upon this review, it is permissible for the patient to stop his blood thinner for 2 days prior to the procedure. If you have any further questions, please feel free to contact the office.        Sincerely,        Charmayne Sieving, APRN - CNP   [place]

## 2019-06-10 NOTE — TELEPHONE ENCOUNTER
Kristyn Del Castillo from Dr. Delisa Guo contacted the office because the patient needs a tooth extraction and is asking if he needs to stop his blood thinner and how many does does he need to be off of it. Kristyn Del Castillo is requesting a letter from this office with the instructions faxed to [phone].

## 2019-06-16 DIAGNOSIS — E03.9 HYPOTHYROIDISM, UNSPECIFIED TYPE: Primary | ICD-10-CM

## 2019-06-17 DIAGNOSIS — I82.501 CHRONIC DEEP VEIN THROMBOSIS (DVT) OF RIGHT LOWER EXTREMITY, UNSPECIFIED VEIN (HCC): ICD-10-CM

## 2019-06-17 DIAGNOSIS — K21.9 GASTROESOPHAGEAL REFLUX DISEASE WITHOUT ESOPHAGITIS: Primary | ICD-10-CM

## 2019-06-17 DIAGNOSIS — G30.1 LATE ONSET ALZHEIMER'S DISEASE WITHOUT BEHAVIORAL DISTURBANCE (HCC): ICD-10-CM

## 2019-06-17 DIAGNOSIS — F02.80 LATE ONSET ALZHEIMER'S DISEASE WITHOUT BEHAVIORAL DISTURBANCE (HCC): ICD-10-CM

## 2019-06-17 DIAGNOSIS — N40.0 BENIGN NON-NODULAR PROSTATIC HYPERPLASIA WITHOUT LOWER URINARY TRACT SYMPTOMS: ICD-10-CM

## 2019-06-17 DIAGNOSIS — E78.2 MIXED HYPERLIPIDEMIA: ICD-10-CM

## 2019-06-17 NOTE — TELEPHONE ENCOUNTER
LRF 3-18-19 memantine # 180 Rf 0, lovastatin 180 RF 0, gliqius # 180 RF 0, silodosin # 90 Rf 0, pantoprazole # 90 RF 0  LOV 5-3-19  RTO 3 mo    Health Maintenance   Topic Date Due    DTaP/Tdap/Td vaccine (1 - Tdap) 03/05/1945    Shingles Vaccine (1 of 2) 03/05/1976    Flu vaccine (Season Ended) 09/01/2019    TSH testing  05/03/2020    Potassium monitoring  05/03/2020    Creatinine monitoring  05/03/2020    Pneumococcal 65+ years Vaccine  Completed             (applicable per patient's age: Cancer Screenings, Depression Screening, Fall Risk Screening, Immunizations)    Hemoglobin A1C (%)   Date Value   10/15/2015 5.1   05/21/2013 5.6     LDL Cholesterol (mg/dL)   Date Value   05/03/2019 57     AST (U/L)   Date Value   05/03/2019 15     ALT (U/L)   Date Value   05/03/2019 9     BUN (mg/dL)   Date Value   05/03/2019 15      (goal A1C is < 7)   (goal LDL is <100) need 30-50% reduction from baseline     BP Readings from Last 3 Encounters:   05/17/19 120/86   05/03/19 130/76   09/27/18 130/70    (goal /80)      All Future Testing planned in Care[facility]:  Lab Frequency Next Occurrence       Next Visit Date:  No future appointments.          Patient Active Problem List:     BPH (benign prostatic hyperplasia)     Hypertension     Hyperlipidemia     Hypothyroidism     CAD (coronary artery disease)     GERD (gastroesophageal reflux disease)     Alzheimer's dementia     History of DVT of lower extremity     Chronic kidney disease, stage 3 (HCC)     Thoracic aortic atherosclerosis (HCC)     Exudative age-related macular degeneration of both eyes with active choroidal neovascularization ([facility] [address]     Dry eyes

## 2019-06-18 ENCOUNTER — OFFICE VISIT (OUTPATIENT)
Dept: FAMILY MEDICINE CLINIC | Age: 84
End: 2019-06-18
Payer: MEDICARE

## 2019-06-18 VITALS
SYSTOLIC BLOOD PRESSURE: 118 MMHG | TEMPERATURE: 98 F | DIASTOLIC BLOOD PRESSURE: 78 MMHG | HEART RATE: 87 BPM | RESPIRATION RATE: 18 BRPM | WEIGHT: 174 LBS | BODY MASS INDEX: 28.08 KG/M2

## 2019-06-18 DIAGNOSIS — L03.116 CELLULITIS OF LEFT LOWER EXTREMITY: Primary | ICD-10-CM

## 2019-06-18 PROCEDURE — G8427 DOCREV CUR MEDS BY ELIG CLIN: HCPCS | Performed by: NURSE PRACTITIONER

## 2019-06-18 PROCEDURE — G8598 ASA/ANTIPLAT THER USED: HCPCS | Performed by: NURSE PRACTITIONER

## 2019-06-18 PROCEDURE — 4040F PNEUMOC VAC/ADMIN/RCVD: CPT | Performed by: NURSE PRACTITIONER

## 2019-06-18 PROCEDURE — 1123F ACP DISCUSS/DSCN MKR DOCD: CPT | Performed by: NURSE PRACTITIONER

## 2019-06-18 PROCEDURE — G8419 CALC BMI OUT NRM PARAM NOF/U: HCPCS | Performed by: NURSE PRACTITIONER

## 2019-06-18 PROCEDURE — 99213 OFFICE O/P EST LOW 20 MIN: CPT | Performed by: NURSE PRACTITIONER

## 2019-06-18 PROCEDURE — 1036F TOBACCO NON-USER: CPT | Performed by: NURSE PRACTITIONER

## 2019-06-18 RX ORDER — PANTOPRAZOLE SODIUM 40 MG/1
40 TABLET, DELAYED RELEASE ORAL DAILY
Qty: 90 TABLET | Refills: 0 | Status: SHIPPED | OUTPATIENT
Start: 2019-06-18 | End: 2019-09-07 | Stop reason: SDUPTHER

## 2019-06-18 RX ORDER — LEVOTHYROXINE SODIUM 0.07 MG/1
75 TABLET ORAL DAILY
Qty: 90 TABLET | Refills: 1 | Status: SHIPPED | OUTPATIENT
Start: 2019-06-18 | End: 2019-12-05 | Stop reason: SDUPTHER

## 2019-06-18 RX ORDER — LOVASTATIN 40 MG/1
80 TABLET ORAL NIGHTLY
Qty: 180 TABLET | Refills: 0 | Status: SHIPPED | OUTPATIENT
Start: 2019-06-18 | End: 2019-09-07 | Stop reason: SDUPTHER

## 2019-06-18 RX ORDER — SILODOSIN 8 MG/1
8 CAPSULE ORAL EVERY EVENING
Qty: 90 CAPSULE | Refills: 0 | Status: SHIPPED | OUTPATIENT
Start: 2019-06-18 | End: 2019-09-07 | Stop reason: SDUPTHER

## 2019-06-18 RX ORDER — AMOXICILLIN AND CLAVULANATE POTASSIUM 875; 125 MG/1; MG/1
1 TABLET, FILM COATED ORAL 2 TIMES DAILY
Qty: 20 TABLET | Refills: 0 | Status: SHIPPED | OUTPATIENT
Start: 2019-06-18 | End: 2019-06-28

## 2019-06-18 RX ORDER — MEMANTINE HYDROCHLORIDE 5 MG/1
5 TABLET ORAL 2 TIMES DAILY
Qty: 180 TABLET | Refills: 0 | Status: SHIPPED | OUTPATIENT
Start: 2019-06-18 | End: 2019-09-07 | Stop reason: SDUPTHER

## 2019-06-18 ASSESSMENT — ENCOUNTER SYMPTOMS
VOMITING: 0
COUGH: 0
NAUSEA: 0
ABDOMINAL PAIN: 0
SHORTNESS OF BREATH: 0
SORE THROAT: 0

## 2019-06-18 NOTE — TELEPHONE ENCOUNTER
LRF 6-25-18 # 90 RF 1  LOV 5-3-19  RTO 6-18-19    Health Maintenance   Topic Date Due    DTaP/Tdap/Td vaccine (1 - Tdap) 03/05/1945    Shingles Vaccine (1 of 2) 03/05/1976    Flu vaccine (Season Ended) 09/01/2019    TSH testing  05/03/2020    Potassium monitoring  05/03/2020    Creatinine monitoring  05/03/2020    Pneumococcal 65+ years Vaccine  Completed             (applicable per patient's age: Cancer Screenings, Depression Screening, Fall Risk Screening, Immunizations)    Hemoglobin A1C (%)   Date Value   10/15/2015 5.1   05/21/2013 5.6     LDL Cholesterol (mg/dL)   Date Value   05/03/2019 57     AST (U/L)   Date Value   05/03/2019 15     ALT (U/L)   Date Value   05/03/2019 9     BUN (mg/dL)   Date Value   05/03/2019 15      (goal A1C is < 7)   (goal LDL is <100) need 30-50% reduction from baseline     BP Readings from Last 3 Encounters:   05/17/19 120/86   05/03/19 130/76   09/27/18 130/70    (goal /80)      All Future Testing planned in CarePATH:  Lab Frequency Next Occurrence       Next Visit Date:  Future Appointments   Date Time Provider Treasure De   6/18/2019  1:20 PM YEVGENIY Herrera - CNP [facility]            Patient Active Problem List:     BPH (benign prostatic hyperplasia)     Hypertension     Hyperlipidemia     Hypothyroidism     CAD (coronary artery disease)     GERD (gastroesophageal reflux disease)     Alzheimer's dementia     History of DVT of lower extremity     Chronic kidney disease, stage 3 ([address]     Thoracic aortic atherosclerosis ([address]     Exudative age-related macular degeneration of both eyes with active choroidal neovascularization ([address]     Dry eyes

## 2019-06-18 NOTE — PROGRESS NOTES
silodosin (RAPAFLO) 8 MG CAPS Take 1 capsule by mouth every evening 90 capsule 0    pantoprazole (PROTONIX) 40 MG tablet Take 1 tablet by mouth daily 90 tablet 0    memantine (NAMENDA) 5 MG tablet Take 1 tablet by mouth 2 times daily 180 tablet 0    lovastatin (MEVACOR) 40 MG tablet Take 2 tablets by mouth nightly 180 tablet 0    apixaban (ELIQUIS) 2.5 MG TABS tablet Take 1 tablet by mouth 2 times daily 180 tablet 0    amoxicillin-clavulanate (AUGMENTIN) 875-125 MG per tablet Take 1 tablet by mouth 2 times daily for 10 days 20 tablet 0    dutasteride (AVODART) 0.5 MG capsule Take 1 capsule by mouth daily 90 capsule 0    donepezil (ARICEPT) 10 MG tablet TAKE 1 TABLET NIGHTLY 90 tablet 1    Calcium-Vitamin D (CALTRATE 600 PLUS-VIT D PO) Take  by mouth.  potassium citrate (UROCIT-K) 10 MEQ (1080 MG) extended release tablet Take 3 tablets by mouth 2 times daily 540 tablet 1     No current facility-administered medications for this visit. HPI    Patient presents to the office today with concerns regarding warmth, erythema, tenderness to top of left foot. Symptoms started in the last week. Was treated early last month for left foot cellulitis, states that he completed the antibiotics and symptoms completely resolved. Very hard of hearing-limits interview somewhat. Denies any fevers or chills. Denies any drainage from the affected area. Reports it is occasionally mildly pruritic and that he does repeatedly scratch it. 2 small scabs present. dwj    Review of Systems   Constitutional: Negative for chills, diaphoresis, fatigue and fever. Very hard of hearing-limits review of systems   HENT: Negative for congestion and sore throat. Respiratory: Negative for cough and shortness of breath. Cardiovascular: Negative for chest pain and leg swelling. Gastrointestinal: Negative for abdominal pain, nausea and vomiting.    Musculoskeletal: Negative for arthralgias, joint swelling, myalgias and or fail to improve, for cellulitis. Bernielicha Chris received counseling on the following healthy behaviors: nutrition, exercise and medication adherence  Reviewed prior labs and health maintenance. Continue current medications, diet and exercise. Discussed use, benefit, and side effects of prescribed medications. Barriers to medication compliance addressed. Patient given educational materials - see patient instructions. All patient questions answered. Patient voiced understanding.            Electronically signed by YEVGENIY Nolasco CNP on 6/18/2019 at 2:27 PM

## 2019-06-18 NOTE — PATIENT INSTRUCTIONS

## 2019-07-02 DIAGNOSIS — N40.0 BENIGN NON-NODULAR PROSTATIC HYPERPLASIA WITHOUT LOWER URINARY TRACT SYMPTOMS: Primary | ICD-10-CM

## 2019-07-02 RX ORDER — DUTASTERIDE 0.5 MG/1
0.5 CAPSULE, LIQUID FILLED ORAL DAILY
Qty: 90 CAPSULE | Refills: 0 | Status: SHIPPED | OUTPATIENT
Start: 2019-07-02 | End: 2019-09-20 | Stop reason: SDUPTHER

## 2019-07-06 DIAGNOSIS — F02.80 LATE ONSET ALZHEIMER'S DISEASE WITHOUT BEHAVIORAL DISTURBANCE (HCC): ICD-10-CM

## 2019-07-06 DIAGNOSIS — G30.1 LATE ONSET ALZHEIMER'S DISEASE WITHOUT BEHAVIORAL DISTURBANCE (HCC): ICD-10-CM

## 2019-07-09 RX ORDER — DONEPEZIL HYDROCHLORIDE 10 MG/1
10 TABLET, FILM COATED ORAL NIGHTLY
Qty: 90 TABLET | Refills: 1 | Status: SHIPPED | OUTPATIENT
Start: 2019-07-09 | End: 2020-01-04

## 2019-08-16 ENCOUNTER — TELEPHONE (OUTPATIENT)
Dept: FAMILY MEDICINE CLINIC | Age: 84
End: 2019-08-16

## 2019-09-07 DIAGNOSIS — N40.0 BENIGN NON-NODULAR PROSTATIC HYPERPLASIA WITHOUT LOWER URINARY TRACT SYMPTOMS: ICD-10-CM

## 2019-09-07 DIAGNOSIS — G30.1 LATE ONSET ALZHEIMER'S DISEASE WITHOUT BEHAVIORAL DISTURBANCE (HCC): ICD-10-CM

## 2019-09-07 DIAGNOSIS — K21.9 GASTROESOPHAGEAL REFLUX DISEASE WITHOUT ESOPHAGITIS: ICD-10-CM

## 2019-09-07 DIAGNOSIS — I82.501 CHRONIC DEEP VEIN THROMBOSIS (DVT) OF RIGHT LOWER EXTREMITY, UNSPECIFIED VEIN (HCC): ICD-10-CM

## 2019-09-07 DIAGNOSIS — E78.2 MIXED HYPERLIPIDEMIA: ICD-10-CM

## 2019-09-07 DIAGNOSIS — F02.80 LATE ONSET ALZHEIMER'S DISEASE WITHOUT BEHAVIORAL DISTURBANCE (HCC): ICD-10-CM

## 2019-09-11 RX ORDER — PANTOPRAZOLE SODIUM 40 MG/1
TABLET, DELAYED RELEASE ORAL
Qty: 90 TABLET | Refills: 0 | Status: SHIPPED | OUTPATIENT
Start: 2019-09-11 | End: 2019-12-05 | Stop reason: SDUPTHER

## 2019-09-11 RX ORDER — LOVASTATIN 40 MG/1
TABLET ORAL
Qty: 180 TABLET | Refills: 0 | Status: SHIPPED | OUTPATIENT
Start: 2019-09-11 | End: 2019-12-05 | Stop reason: SDUPTHER

## 2019-09-11 RX ORDER — APIXABAN 2.5 MG/1
TABLET, FILM COATED ORAL
Qty: 180 TABLET | Refills: 0 | Status: SHIPPED | OUTPATIENT
Start: 2019-09-11 | End: 2019-12-05 | Stop reason: SDUPTHER

## 2019-09-11 RX ORDER — MEMANTINE HYDROCHLORIDE 5 MG/1
TABLET ORAL
Qty: 180 TABLET | Refills: 0 | Status: SHIPPED | OUTPATIENT
Start: 2019-09-11 | End: 2019-12-03 | Stop reason: SDUPTHER

## 2019-09-11 RX ORDER — SILODOSIN 8 MG/1
CAPSULE ORAL
Qty: 90 CAPSULE | Refills: 0 | Status: SHIPPED | OUTPATIENT
Start: 2019-09-11 | End: 2019-12-05 | Stop reason: SDUPTHER

## 2019-09-20 DIAGNOSIS — N40.0 BENIGN NON-NODULAR PROSTATIC HYPERPLASIA WITHOUT LOWER URINARY TRACT SYMPTOMS: ICD-10-CM

## 2019-09-20 RX ORDER — DUTASTERIDE 0.5 MG/1
0.5 CAPSULE, LIQUID FILLED ORAL DAILY
Qty: 90 CAPSULE | Refills: 1 | Status: SHIPPED | OUTPATIENT
Start: 2019-09-20 | End: 2020-03-04

## 2019-10-01 ENCOUNTER — OFFICE VISIT (OUTPATIENT)
Dept: FAMILY MEDICINE CLINIC | Age: 84
End: 2019-10-01
Payer: MEDICARE

## 2019-10-01 VITALS
SYSTOLIC BLOOD PRESSURE: 128 MMHG | RESPIRATION RATE: 18 BRPM | HEIGHT: 65 IN | TEMPERATURE: 98.1 F | BODY MASS INDEX: 28.99 KG/M2 | DIASTOLIC BLOOD PRESSURE: 80 MMHG | WEIGHT: 174 LBS | HEART RATE: 78 BPM

## 2019-10-01 DIAGNOSIS — I10 ESSENTIAL HYPERTENSION: ICD-10-CM

## 2019-10-01 DIAGNOSIS — Z23 NEED FOR INFLUENZA VACCINATION: ICD-10-CM

## 2019-10-01 DIAGNOSIS — E03.9 HYPOTHYROIDISM, UNSPECIFIED TYPE: ICD-10-CM

## 2019-10-01 DIAGNOSIS — E78.2 MIXED HYPERLIPIDEMIA: Primary | ICD-10-CM

## 2019-10-01 PROCEDURE — G8598 ASA/ANTIPLAT THER USED: HCPCS | Performed by: NURSE PRACTITIONER

## 2019-10-01 PROCEDURE — G8482 FLU IMMUNIZE ORDER/ADMIN: HCPCS | Performed by: NURSE PRACTITIONER

## 2019-10-01 PROCEDURE — G8427 DOCREV CUR MEDS BY ELIG CLIN: HCPCS | Performed by: NURSE PRACTITIONER

## 2019-10-01 PROCEDURE — 1123F ACP DISCUSS/DSCN MKR DOCD: CPT | Performed by: NURSE PRACTITIONER

## 2019-10-01 PROCEDURE — 1036F TOBACCO NON-USER: CPT | Performed by: NURSE PRACTITIONER

## 2019-10-01 PROCEDURE — 4040F PNEUMOC VAC/ADMIN/RCVD: CPT | Performed by: NURSE PRACTITIONER

## 2019-10-01 PROCEDURE — 99214 OFFICE O/P EST MOD 30 MIN: CPT | Performed by: NURSE PRACTITIONER

## 2019-10-01 PROCEDURE — G0008 ADMIN INFLUENZA VIRUS VAC: HCPCS | Performed by: NURSE PRACTITIONER

## 2019-10-01 PROCEDURE — G8419 CALC BMI OUT NRM PARAM NOF/U: HCPCS | Performed by: NURSE PRACTITIONER

## 2019-10-01 PROCEDURE — 90686 IIV4 VACC NO PRSV 0.5 ML IM: CPT | Performed by: NURSE PRACTITIONER

## 2019-10-01 ASSESSMENT — ENCOUNTER SYMPTOMS
BLOOD IN STOOL: 0
SHORTNESS OF BREATH: 0
NAUSEA: 0
ABDOMINAL PAIN: 0
CHEST TIGHTNESS: 0
VOMITING: 0
RHINORRHEA: 0
BACK PAIN: 0
SORE THROAT: 0
DIARRHEA: 0
EYE PAIN: 0
CONSTIPATION: 0
WHEEZING: 0
ABDOMINAL DISTENTION: 0
COUGH: 0
PHOTOPHOBIA: 0

## 2019-10-02 ENCOUNTER — HOSPITAL ENCOUNTER (OUTPATIENT)
Age: 84
Setting detail: SPECIMEN
Discharge: HOME | End: 2019-10-02
Payer: MEDICARE

## 2019-10-02 DIAGNOSIS — I10 ESSENTIAL HYPERTENSION: ICD-10-CM

## 2019-10-02 DIAGNOSIS — E03.9 HYPOTHYROIDISM, UNSPECIFIED TYPE: ICD-10-CM

## 2019-10-02 DIAGNOSIS — E78.2 MIXED HYPERLIPIDEMIA: ICD-10-CM

## 2019-10-02 LAB
ALBUMIN SERPL-MCNC: 4.2 G/DL (ref 3.5–5.2)
ALBUMIN/GLOBULIN RATIO: 1.9 (ref 1–2.5)
ALP BLD-CCNC: 87 U/L (ref 40–129)
ALT SERPL-CCNC: 7 U/L (ref 5–41)
ANION GAP SERPL CALCULATED.3IONS-SCNC: 13 MMOL/L (ref 9–17)
AST SERPL-CCNC: 13 U/L
BILIRUB SERPL-MCNC: 0.71 MG/DL (ref 0.3–1.2)
BUN BLDV-MCNC: 15 MG/DL (ref 8–23)
BUN/CREAT BLD: ABNORMAL (ref 9–20)
CALCIUM SERPL-MCNC: 10.1 MG/DL (ref 8.6–10.4)
CHLORIDE BLD-SCNC: 105 MMOL/L (ref 98–107)
CHOLESTEROL/HDL RATIO: 3.2
CHOLESTEROL: 152 MG/DL
CO2: 24 MMOL/L (ref 20–31)
CREAT SERPL-MCNC: 0.96 MG/DL (ref 0.7–1.2)
GFR AFRICAN AMERICAN: >60 ML/MIN
GFR NON-AFRICAN AMERICAN: >60 ML/MIN
GFR SERPL CREATININE-BSD FRML MDRD: ABNORMAL ML/MIN/{1.73_M2}
GFR SERPL CREATININE-BSD FRML MDRD: ABNORMAL ML/MIN/{1.73_M2}
GLUCOSE FASTING: 104 MG/DL (ref 70–99)
HCT VFR BLD CALC: 48.6 % (ref 40.7–50.3)
HDLC SERPL-MCNC: 47 MG/DL
HEMOGLOBIN: 15.5 G/DL (ref 13–17)
LDL CHOLESTEROL: 73 MG/DL (ref 0–130)
MCH RBC QN AUTO: 30.6 PG (ref 25.2–33.5)
MCHC RBC AUTO-ENTMCNC: 31.9 G/DL (ref 28.4–34.8)
MCV RBC AUTO: 95.9 FL (ref 82.6–102.9)
NRBC AUTOMATED: 0 PER 100 WBC
PDW BLD-RTO: 14.5 % (ref 11.8–14.4)
PLATELET # BLD: 198 K/UL (ref 138–453)
PMV BLD AUTO: 11.1 FL (ref 8.1–13.5)
POTASSIUM SERPL-SCNC: 4.2 MMOL/L (ref 3.7–5.3)
RBC # BLD: 5.07 M/UL (ref 4.21–5.77)
SODIUM BLD-SCNC: 142 MMOL/L (ref 135–144)
TOTAL PROTEIN: 6.4 G/DL (ref 6.4–8.3)
TRIGL SERPL-MCNC: 158 MG/DL
TSH SERPL DL<=0.05 MIU/L-ACNC: 4.58 MIU/L (ref 0.3–5)
VLDLC SERPL CALC-MCNC: ABNORMAL MG/DL (ref 1–30)
WBC # BLD: 7.6 K/UL (ref 3.5–11.3)

## 2020-01-04 RX ORDER — DONEPEZIL HYDROCHLORIDE 10 MG/1
10 TABLET, FILM COATED ORAL NIGHTLY
Qty: 90 TABLET | Refills: 1 | Status: SHIPPED | OUTPATIENT
Start: 2020-01-04 | End: 2020-07-03 | Stop reason: SDUPTHER

## 2020-01-04 NOTE — TELEPHONE ENCOUNTER
LOV 10-1-19  LRF 7-9-19  RTO in 4 mos. Health Maintenance   Topic Date Due    Shingles Vaccine (1 of 2) 03/05/1976    Annual Wellness Visit (AWV)  06/18/2019    DTaP/Tdap/Td vaccine (1 - Tdap) 10/01/2020 (Originally 3/5/1937)    Lipid screen  10/02/2020    TSH testing  10/02/2020    Potassium monitoring  10/02/2020    Creatinine monitoring  10/02/2020    Flu vaccine  Completed    Pneumococcal 65+ years Vaccine  Completed             (applicable per patient's age: Cancer Screenings, Depression Screening, Fall Risk Screening, Immunizations)    Hemoglobin A1C (%)   Date Value   10/15/2015 5.1   05/21/2013 5.6     LDL Cholesterol (mg/dL)   Date Value   10/02/2019 73     AST (U/L)   Date Value   10/02/2019 13     ALT (U/L)   Date Value   10/02/2019 7     BUN (mg/dL)   Date Value   10/02/2019 15      (goal A1C is < 7)   (goal LDL is <100) need 30-50% reduction from baseline     BP Readings from Last 3 Encounters:   10/01/19 128/80   06/18/19 118/78   05/17/19 120/86    (goal /80)      All Future Testing planned in CarePATH:  Lab Frequency Next Occurrence       Next Visit Date:  No future appointments.          Patient Active Problem List:     BPH (benign prostatic hyperplasia)     Hypertension     Hyperlipidemia     Hypothyroidism     CAD (coronary artery disease)     GERD (gastroesophageal reflux disease)     Alzheimer's dementia ([address]     History of DVT of lower extremity     Chronic kidney disease, stage 3 ([address]     Thoracic aortic atherosclerosis ([address]     Exudative age-related macular degeneration of both eyes with active choroidal neovascularization ([address]     Dry eyes

## 2020-03-03 ENCOUNTER — OFFICE VISIT (OUTPATIENT)
Dept: FAMILY MEDICINE CLINIC | Age: 85
End: 2020-03-03
Payer: MEDICARE

## 2020-03-03 ENCOUNTER — HOSPITAL ENCOUNTER (OUTPATIENT)
Age: 85
Setting detail: SPECIMEN
Discharge: HOME | End: 2020-03-03
Payer: MEDICARE

## 2020-03-03 VITALS
SYSTOLIC BLOOD PRESSURE: 124 MMHG | HEART RATE: 81 BPM | TEMPERATURE: 97.6 F | DIASTOLIC BLOOD PRESSURE: 84 MMHG | BODY MASS INDEX: 29.74 KG/M2 | WEIGHT: 176 LBS

## 2020-03-03 LAB — URIC ACID: 4.7 MG/DL (ref 3.4–7)

## 2020-03-03 PROCEDURE — 99213 OFFICE O/P EST LOW 20 MIN: CPT | Performed by: NURSE PRACTITIONER

## 2020-03-03 RX ORDER — CEPHALEXIN 500 MG/1
500 CAPSULE ORAL 3 TIMES DAILY
Qty: 30 CAPSULE | Refills: 0 | Status: SHIPPED | OUTPATIENT
Start: 2020-03-03 | End: 2020-03-13

## 2020-03-03 ASSESSMENT — PATIENT HEALTH QUESTIONNAIRE - PHQ9
SUM OF ALL RESPONSES TO PHQ QUESTIONS 1-9: 0
SUM OF ALL RESPONSES TO PHQ QUESTIONS 1-9: 0
SUM OF ALL RESPONSES TO PHQ9 QUESTIONS 1 & 2: 0
1. LITTLE INTEREST OR PLEASURE IN DOING THINGS: 0
2. FEELING DOWN, DEPRESSED OR HOPELESS: 0

## 2020-03-03 ASSESSMENT — ENCOUNTER SYMPTOMS: SHORTNESS OF BREATH: 0

## 2020-03-03 NOTE — PATIENT INSTRUCTIONS
Patient Education        Gout: Care Instructions  Your Care Instructions    Gout is a form of arthritis caused by a buildup of uric acid crystals in a joint. It causes sudden attacks of pain, swelling, redness, and stiffness, usually in one joint, especially the big toe. Gout usually comes on without a cause. But it can be brought on by drinking alcohol (especially beer) or eating seafood and red meat. Taking certain medicines, such as diuretics or aspirin, also can bring on an attack of gout. Taking your medicines as prescribed and following up with your doctor regularly can help you avoid gout attacks in the future. Follow-up care is a key part of your treatment and safety. Be sure to make and go to all appointments, and call your doctor if you are having problems. It's also a good idea to know your test results and keep a list of the medicines you take. How can you care for yourself at home? · If the joint is swollen, put ice or a cold pack on the area for 10 to 20 minutes at a time. Put a thin cloth between the ice and your skin. · Prop up the sore limb on a pillow when you ice it or anytime you sit or lie down during the next 3 days. Try to keep it above the level of your heart. This will help reduce swelling. · Rest sore joints. Avoid activities that put weight or strain on the joints for a few days. Take short rest breaks from your regular activities during the day. · Take your medicines exactly as prescribed. Call your doctor if you think you are having a problem with your medicine. · Take pain medicines exactly as directed. ? If the doctor gave you a prescription medicine for pain, take it as prescribed. ? If you are not taking a prescription pain medicine, ask your doctor if you can take an over-the-counter medicine. · Eat less seafood and red meat. · Check with your doctor before drinking alcohol. · Losing weight, if you are overweight, may help reduce attacks of gout.  But do not go on a \"crash diet. \" Losing a lot of weight in a short amount of time can cause a gout attack. When should you call for help? Call your doctor now or seek immediate medical care if:    · You have a fever.     · The joint is so painful you cannot use it.     · You have sudden, unexplained swelling, redness, warmth, or severe pain in one or more joints.    Watch closely for changes in your health, and be sure to contact your doctor if:    · You have joint pain.     · Your symptoms get worse or are not improving after 2 or 3 days. Where can you learn more? Go to https://VelocixpeProStor Systemseb.Near Infinity. org and sign in to your [employer] account. Enter P807 in the Phoodeez box to learn more about \"Gout: Care Instructions. \"     If you do not have an account, please click on the \"Sign Up Now\" link. Current as of: April 1, 2019  Content Version: 12.3  © 1804-1628 [email or web address]. Care instructions adapted under license by [facility] ([facility]). If you have questions about a medical condition or this instruction, always ask your healthcare professional. [place] [address] any warranty or liability for your use of this information. Patient Education        Cellulitis: Care Instructions  Your Care Instructions    Cellulitis is a skin infection caused by bacteria, most often strep or staph. It often occurs after a break in the skin from a scrape, cut, bite, or puncture, or after a rash. Cellulitis may be treated without doing tests to find out what caused it. But your doctor may do tests, if needed, to look for a specific bacteria, like methicillin-resistant Staphylococcus aureus (MRSA). The doctor has checked you carefully, but problems can develop later. If you notice any problems or new symptoms, get medical treatment right away. Follow-up care is a key part of your treatment and safety. Be sure to make and go to all appointments, and call your doctor if you are having problems.  It's also a good idea to know your test results and keep a list of the medicines you take. How can you care for yourself at home? · Take your antibiotics as directed. Do not stop taking them just because you feel better. You need to take the full course of antibiotics. · Prop up the infected area on pillows to reduce pain and swelling. Try to keep the area above the level of your heart as often as you can. · If your doctor told you how to care for your wound, follow your doctor's instructions. If you did not get instructions, follow this general advice:  ? Wash the wound with clean water 2 times a day. Don't use hydrogen peroxide or alcohol, which can slow healing. ? You may cover the wound with a thin layer of petroleum jelly, such as Vaseline, and a nonstick bandage. ? Apply more petroleum jelly and replace the bandage as needed. · Be safe with medicines. Take pain medicines exactly as directed. ? If the doctor gave you a prescription medicine for pain, take it as prescribed. ? If you are not taking a prescription pain medicine, ask your doctor if you can take an over-the-counter medicine. To prevent cellulitis in the future  · Try to prevent cuts, scrapes, or other injuries to your skin. Cellulitis most often occurs where there is a break in the skin. · If you get a scrape, cut, mild burn, or bite, wash the wound with clean water as soon as you can to help avoid infection. Don't use hydrogen peroxide or alcohol, which can slow healing. · If you have swelling in your legs (edema), support stockings and good skin care may help prevent leg sores and cellulitis. · Take care of your feet, especially if you have diabetes or other conditions that increase the risk of infection. Wear shoes and socks. Do not go barefoot. If you have athlete's foot or other skin problems on your feet, talk to your doctor about how to treat them. When should you call for help?   Call your doctor now or seek immediate medical care if:    · You have signs that your infection is getting worse, such as:  ? Increased pain, swelling, warmth, or redness. ? Red streaks leading from the area. ? Pus draining from the area. ? A fever.     · You get a rash.    Watch closely for changes in your health, and be sure to contact your doctor if:    · You do not get better as expected. Where can you learn more? Go to https://Lyticspepiceweb.Quvium. org and sign in to your Lyst account. Enter P952 in the OnState box to learn more about \"Cellulitis: Care Instructions. \"     If you do not have an account, please click on the \"Sign Up Now\" link. Current as of: April 1, 2019  Content Version: 12.3  © 0818-9302 Healthwise, Incorporated. Care instructions adapted under license by [facility] ([facility]). If you have questions about a medical condition or this instruction, always ask your healthcare professional. [place] [address] any warranty or liability for your use of this information.

## 2020-03-05 RX ORDER — DUTASTERIDE 0.5 MG/1
0.5 CAPSULE, LIQUID FILLED ORAL DAILY
Qty: 90 CAPSULE | Refills: 1 | Status: SHIPPED | OUTPATIENT
Start: 2020-03-05 | End: 2020-09-17

## 2020-07-02 NOTE — TELEPHONE ENCOUNTER
Last visit:10/1/19, saw AL for SD 2020  Last Med refill: 1/4/20  Does patient have enough medication for 72 hours: No:     Next Visit Date:  No future appointments.     Health Maintenance   Topic Date Due    Shingles Vaccine (1 of 2) 03/05/1976    Annual Wellness Visit (AWV)  06/18/2019    DTaP/Tdap/Td vaccine (1 - Tdap) 10/01/2020 (Originally 3/5/1945)    Flu vaccine (1) 09/01/2020    Lipid screen  10/02/2020    TSH testing  10/02/2020    Potassium monitoring  10/02/2020    Creatinine monitoring  10/02/2020    Pneumococcal 65+ years Vaccine  Completed    Hepatitis A vaccine  Aged Out    Hepatitis B vaccine  Aged Out    Hib vaccine  Aged Out    Meningococcal (ACWY) vaccine  Aged Out       Hemoglobin A1C (%)   Date Value   10/15/2015 5.1   05/21/2013 5.6             ( goal A1C is < 7)   No results found for: LABMICR  LDL Cholesterol (mg/dL)   Date Value   10/02/2019 73   05/03/2019 57       (goal LDL is <100)   AST (U/L)   Date Value   10/02/2019 13     ALT (U/L)   Date Value   10/02/2019 7     BUN (mg/dL)   Date Value   10/02/2019 15     BP Readings from Last 3 Encounters:   03/03/20 124/84   10/01/19 128/80   06/18/19 118/78          (goal 120/80)    All Future Testing planned in Care[facility]  Lab Frequency Next Occurrence               Patient Active Problem List:     BPH (benign prostatic hyperplasia)     Hypertension     Hyperlipidemia     Hypothyroidism     CAD (coronary artery disease)     GERD (gastroesophageal reflux disease)     Alzheimer's dementia ([facility] [address]     History of DVT of lower extremity     Chronic kidney disease, stage 3 (HCC)     Thoracic aortic atherosclerosis (HCC)     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

## 2020-07-03 RX ORDER — DONEPEZIL HYDROCHLORIDE 10 MG/1
TABLET, FILM COATED ORAL
Qty: 90 TABLET | Refills: 1 | Status: SHIPPED | OUTPATIENT
Start: 2020-07-03 | End: 2020-12-16

## 2020-07-15 RX ORDER — MEMANTINE HYDROCHLORIDE 5 MG/1
5 TABLET ORAL 2 TIMES DAILY
Qty: 180 TABLET | Refills: 1 | Status: SHIPPED | OUTPATIENT
Start: 2020-07-15 | End: 2020-12-16 | Stop reason: SDUPTHER

## 2020-08-11 ENCOUNTER — OFFICE VISIT (OUTPATIENT)
Dept: FAMILY MEDICINE CLINIC | Age: 85
End: 2020-08-11
Payer: MEDICARE

## 2020-08-11 VITALS
HEART RATE: 71 BPM | SYSTOLIC BLOOD PRESSURE: 110 MMHG | RESPIRATION RATE: 16 BRPM | OXYGEN SATURATION: 98 % | TEMPERATURE: 96.1 F | HEIGHT: 67 IN | WEIGHT: 172 LBS | DIASTOLIC BLOOD PRESSURE: 77 MMHG | BODY MASS INDEX: 27 KG/M2

## 2020-08-11 PROCEDURE — 99214 OFFICE O/P EST MOD 30 MIN: CPT | Performed by: NURSE PRACTITIONER

## 2020-08-11 SDOH — ECONOMIC STABILITY: TRANSPORTATION INSECURITY
IN THE PAST 12 MONTHS, HAS THE LACK OF TRANSPORTATION KEPT YOU FROM MEDICAL APPOINTMENTS OR FROM GETTING MEDICATIONS?: NO

## 2020-08-11 SDOH — ECONOMIC STABILITY: TRANSPORTATION INSECURITY
IN THE PAST 12 MONTHS, HAS LACK OF TRANSPORTATION KEPT YOU FROM MEETINGS, WORK, OR FROM GETTING THINGS NEEDED FOR DAILY LIVING?: NO

## 2020-08-11 SDOH — ECONOMIC STABILITY: INCOME INSECURITY: HOW HARD IS IT FOR YOU TO PAY FOR THE VERY BASICS LIKE FOOD, HOUSING, MEDICAL CARE, AND HEATING?: NOT HARD AT ALL

## 2020-08-11 SDOH — ECONOMIC STABILITY: FOOD INSECURITY: WITHIN THE PAST 12 MONTHS, YOU WORRIED THAT YOUR FOOD WOULD RUN OUT BEFORE YOU GOT MONEY TO BUY MORE.: NEVER TRUE

## 2020-08-11 SDOH — ECONOMIC STABILITY: FOOD INSECURITY: WITHIN THE PAST 12 MONTHS, THE FOOD YOU BOUGHT JUST DIDN'T LAST AND YOU DIDN'T HAVE MONEY TO GET MORE.: NEVER TRUE

## 2020-08-11 ASSESSMENT — ENCOUNTER SYMPTOMS
BLOOD IN STOOL: 0
ABDOMINAL PAIN: 0
COUGH: 0
CHEST TIGHTNESS: 0
SHORTNESS OF BREATH: 0
VOMITING: 0
SORE THROAT: 0
WHEEZING: 0
PHOTOPHOBIA: 0
NAUSEA: 0
ABDOMINAL DISTENTION: 0
RHINORRHEA: 0
DIARRHEA: 0
CONSTIPATION: 0
BACK PAIN: 0
EYE PAIN: 0

## 2020-08-11 ASSESSMENT — PATIENT HEALTH QUESTIONNAIRE - PHQ9
1. LITTLE INTEREST OR PLEASURE IN DOING THINGS: 1
2. FEELING DOWN, DEPRESSED OR HOPELESS: 1
SUM OF ALL RESPONSES TO PHQ QUESTIONS 1-9: 2
SUM OF ALL RESPONSES TO PHQ QUESTIONS 1-9: 2
SUM OF ALL RESPONSES TO PHQ9 QUESTIONS 1 & 2: 2

## 2020-08-11 NOTE — PROGRESS NOTES
Subjective:      Patient ID: Oly Angulo is a 80 y.o. male. Visit Information    Have you changed or started any medications since your last visit including any over-the-counter medicines, vitamins, or herbal medicines? no   Are you having any side effects from any of your medications? -  no  Have you stopped taking any of your medications? Is so, why? -  no    Have you seen any other physician or provider since your last visit? podiatry  Have you had any other diagnostic tests since your last visit? No  Have you been seen in the emergency room and/or had an admission to a hospital since we last saw you? No  Have you had your routine dental cleaning in the past 6 months? yes -     Have you activated your [employer] account? If not, what are your barriers?  Yes     Patient Care Team:  YEVGENIY Ortiz CNP as PCP - General (Family Nurse Practitioner)  YEVGENIY Riley NP as PCP - [facility]    Medical History Review  Past Medical, Family, and Social History reviewed and does contribute to the patient presenting condition    Health Maintenance   Topic Date Due    DTaP/Tdap/Td vaccine (1 - Tdap) 10/01/2020 (Originally 3/5/1945)    Annual Wellness Visit (AWV)  08/11/2021 (Originally 6/18/2019)    Shingles Vaccine (1 of 2) 08/11/2021 (Originally 3/5/1976)    Flu vaccine (1) 09/01/2020    Lipid screen  10/02/2020    TSH testing  10/02/2020    Potassium monitoring  10/02/2020    Creatinine monitoring  10/02/2020    Pneumococcal 65+ years Vaccine  Completed    Hepatitis A vaccine  Aged Out    Hepatitis B vaccine  Aged Out    Hib vaccine  Aged Out    Meningococcal (ACWY) vaccine  Aged Out     /77 (Site: Left Upper Arm, Position: Sitting, Cuff Size: Medium Adult)   Pulse 71   Temp 96.1 °F (35.6 °C) (Temporal)   Resp 16   Ht 5' 7\" (1.702 m)   Wt 172 lb (78 kg)   SpO2 98%   BMI 26.94 kg/m²      PHQ Scores 8/11/2020 3/3/2020 5/3/2019 1/15/2018 10/14/2016 2/27/2015 1/8/2014   PHQ2 Score 2 0 0 0 0 4 0   PHQ9 Score 2 0 0 0 0 16 0     Interpretation of Total Score DepressionSeverity: 1-4 = Minimal depression, 5-9 = Mild depression, 10-14 = Moderate depression, 15-19 = Moderately severe depression, 20-27 = Severe depression    Current Outpatient Medications   Medication Sig Dispense Refill    memantine (NAMENDA) 5 MG tablet Take 1 tablet by mouth 2 times daily 180 tablet 1    apixaban (ELIQUIS) 2.5 MG TABS tablet Take 1 tablet by mouth 2 times daily 180 tablet 1    donepezil (ARICEPT) 10 MG tablet TAKE 1 TABLET NIGHTLY 90 tablet 1    dutasteride (AVODART) 0.5 MG capsule Take 1 capsule by mouth daily Do not crush or break. 90 capsule 1    apixaban (ELIQUIS) 2.5 MG TABS tablet Take 1 tablet by mouth 2 times daily 180 tablet 1    levothyroxine (SYNTHROID) 75 MCG tablet Take 1 tablet by mouth Daily 90 tablet 1    pantoprazole (PROTONIX) 40 MG tablet Take 1 tablet by mouth daily 90 tablet 1    silodosin (RAPAFLO) 8 MG CAPS Take 1 capsule by mouth every evening 90 capsule 1    lovastatin (MEVACOR) 40 MG tablet Take 2 tablets by mouth nightly 180 tablet 1    memantine (NAMENDA) 5 MG tablet Take 1 tablet by mouth 2 times daily 180 tablet 1    potassium citrate (UROCIT-K) 10 MEQ (1080 MG) extended release tablet Take 3 tablets by mouth 2 times daily 540 tablet 1    Calcium-Vitamin D (CALTRATE 600 PLUS-VIT D PO) Take  by mouth. No current facility-administered medications for this visit. Fidel Pelletier dtr in law. Lives alone. Stopped driving earlier this year. stilla ctive. No cooking. Uses microwave or family will bring meals. Fidel Pelletier takes him grocery shopping. She also sets up meds for him. Uses hearing aides. Wears glasses. Wife passed 5 yrs ago. He is lonely. Looking into  come in. Looking into possible asisted living or indelentat ccare. Review of Systems   Constitutional: Negative for appetite change, chills, fatigue, fever and unexpected weight change.         ROS limited due to hearing    HENT: Negative for congestion, ear pain, postnasal drip, rhinorrhea and sore throat. Bilateral hearing aides   Eyes: Negative for photophobia, pain and visual disturbance. Wears glasses   Respiratory: Negative for cough, chest tightness, shortness of breath and wheezing. Cardiovascular: Negative for chest pain and palpitations. Gastrointestinal: Negative for abdominal distention, abdominal pain, blood in stool, constipation, diarrhea, nausea and vomiting. Endocrine: Negative for polydipsia and polyuria. Genitourinary: Negative for dysuria, frequency, hematuria and urgency. Musculoskeletal: Negative for back pain, gait problem, myalgias and neck stiffness. Skin: Negative for rash and wound. Allergic/Immunologic: Negative for environmental allergies and food allergies. Neurological: Negative for dizziness, seizures, syncope, speech difficulty, weakness, numbness and headaches. Alzheimer's stable   Hematological: Negative for adenopathy. Psychiatric/Behavioral: Negative for dysphoric mood, self-injury and suicidal ideas. The patient is not nervous/anxious. Misses wife, feels sad sometimes       Objective:   Physical Exam  Vitals signs and nursing note reviewed. Constitutional:       General: He is not in acute distress. Appearance: Normal appearance. He is well-developed and well-groomed. He is not ill-appearing. HENT:      Head: Normocephalic and atraumatic. Right Ear: Tympanic membrane, ear canal and external ear normal. Decreased hearing noted. Left Ear: Tympanic membrane, ear canal and external ear normal. Decreased hearing noted. Ears:      Comments: Bilateral hearing aides noted     Nose: Nose normal.      Mouth/Throat:      Lips: Pink. Mouth: Mucous membranes are moist.      Pharynx: Oropharynx is clear. Uvula midline.    Eyes:      Conjunctiva/sclera: Conjunctivae normal.      Pupils: Pupils are equal, round, and reactive to light. Neck:      Musculoskeletal: Normal range of motion. Thyroid: No thyroid mass. Trachea: Trachea normal.   Cardiovascular:      Rate and Rhythm: Normal rate and regular rhythm. Pulses: Normal pulses. Carotid pulses are 2+ on the right side and 2+ on the left side. Radial pulses are 2+ on the right side and 2+ on the left side. Dorsalis pedis pulses are 2+ on the right side and 2+ on the left side. Posterior tibial pulses are 2+ on the right side and 2+ on the left side. Heart sounds: Normal heart sounds, S1 normal and S2 normal.   Pulmonary:      Effort: Pulmonary effort is normal.      Breath sounds: Normal breath sounds. No decreased breath sounds or wheezing. Abdominal:      General: Bowel sounds are normal.      Palpations: Abdomen is soft. Tenderness: There is no abdominal tenderness. Musculoskeletal:      Right lower leg: No edema. Left lower leg: No edema. Lymphadenopathy:      Cervical: No cervical adenopathy. Skin:     General: Skin is warm and dry. Capillary Refill: Capillary refill takes less than 2 seconds. Coloration: Skin is not pale. Findings: No rash. Neurological:      Mental Status: He is alert and oriented to person, place, and time. Mental status is at baseline. Motor: Motor function is intact. Coordination: Coordination is intact. Gait: Gait is intact. Psychiatric:         Attention and Perception: Attention normal.         Mood and Affect: Mood normal.         Speech: Speech normal.         Behavior: Behavior normal. Behavior is cooperative. Thought Content: Thought content normal.         Cognition and Memory: Cognition normal.         Judgment: Judgment normal.         Assessment / Plan:     1. Essential hypertension  Stable   - CBC; Future  - Comprehensive Metabolic Panel; Future    2. Thoracic aortic atherosclerosis (HCC)  Stable     3.  Hypothyroidism, unspecified type  Stable   - TSH without Reflex; Future    4. Mixed hyperlipidemia  Stable     5. Gastroesophageal reflux disease without esophagitis  Stable     6. Late onset Alzheimer's disease without behavioral disturbance (HCC)  Stable     7.  Exudative age-related macular degeneration of both eyes with active choroidal neovascularization ([address]  Stable     Proceed with labs as ordered  Continue all medications as ordered  Encouraged adequate fluid intake  Discussed home safety tips to prevent falls  Monitor for increase in symptoms  Call office with any questions or concerns      Return in about 1 year (around 8/11/2021) for annual physical.          Electronically signed by YEVGENIY Andersen NP on 8/11/2020 at 9:48 PM

## 2020-09-02 ENCOUNTER — TELEPHONE (OUTPATIENT)
Dept: FAMILY MEDICINE CLINIC | Age: 85
End: 2020-09-02

## 2020-09-02 RX ORDER — CEPHALEXIN 500 MG/1
500 CAPSULE ORAL 2 TIMES DAILY
Qty: 14 CAPSULE | Refills: 0 | Status: SHIPPED | OUTPATIENT
Start: 2020-09-02 | End: 2020-09-09

## 2020-09-02 NOTE — TELEPHONE ENCOUNTER
Patient KIMBERLI contacted the office today because he is having renewed left foot pain in the top in the middle of his foot just before the ankle. Patient denies any swelling or redness. Patient states it has been itching. Patient KIMBERLI states that he has been itching at it. Using [employer].

## 2020-09-09 RX ORDER — SILODOSIN 8 MG/1
8 CAPSULE ORAL EVERY EVENING
Qty: 90 CAPSULE | Refills: 1 | Status: SHIPPED | OUTPATIENT
Start: 2020-09-09 | End: 2020-12-16 | Stop reason: SDUPTHER

## 2020-09-09 RX ORDER — LOVASTATIN 40 MG/1
80 TABLET ORAL NIGHTLY
Qty: 180 TABLET | Refills: 1 | Status: SHIPPED | OUTPATIENT
Start: 2020-09-09 | End: 2020-12-16 | Stop reason: SDUPTHER

## 2020-09-09 NOTE — TELEPHONE ENCOUNTER
Patient requesting 90 day script plus refills of Lovastatin and Rapaflo to go to [facility] mail away Rx

## 2020-09-09 NOTE — TELEPHONE ENCOUNTER
Last OARRS Review-0  Last Office Visit-08/11/2020  Return To Office- 1 year  Next Appointment Date-0  Last Refill Date-12/06/2019  90 caps   Number of Refills Given- 1    Health Maintenance   Topic Date Due    Flu vaccine (1) 09/01/2020    DTaP/Tdap/Td vaccine (1 - Tdap) 10/01/2020 (Originally 3/5/1945)    Annual Wellness Visit (AWV)  08/11/2021 (Originally 6/18/2019)    Shingles Vaccine (1 of 2) 08/11/2021 (Originally 3/5/1976)    Lipid screen  10/02/2020    TSH testing  10/02/2020    Potassium monitoring  10/02/2020    Creatinine monitoring  10/02/2020    Pneumococcal 65+ years Vaccine  Completed    Hepatitis A vaccine  Aged Out    Hepatitis B vaccine  Aged Out    Hib vaccine  Aged Out    Meningococcal (ACWY) vaccine  Aged Out             (applicable per patient's age: Cancer Screenings, Depression Screening, Fall Risk Screening, Immunizations)    Hemoglobin A1C (%)   Date Value   10/15/2015 5.1   05/21/2013 5.6     LDL Cholesterol (mg/dL)   Date Value   10/02/2019 73     AST (U/L)   Date Value   10/02/2019 13     ALT (U/L)   Date Value   10/02/2019 7     BUN (mg/dL)   Date Value   10/02/2019 15      (goal A1C is < 7)   (goal LDL is <100) need 30-50% reduction from baseline     BP Readings from Last 3 Encounters:   08/11/20 110/77   03/03/20 124/84   10/01/19 128/80    (goal /80)      All Future Testing planned in [facility]:  Lab Frequency Next Occurrence   CBC Once 08/11/2020   Comprehensive Metabolic Panel Once 28/54/3924   TSH without Reflex Once 08/11/2020       Next Visit Date:  No future appointments.          Patient Active Problem List:     BPH (benign prostatic hyperplasia)     Hypertension     Hyperlipidemia     Hypothyroidism     CAD (coronary artery disease)     GERD (gastroesophageal reflux disease)     Alzheimer's dementia ([address]     History of DVT of lower extremity     Chronic kidney disease, stage 3 ([address]     Thoracic aortic atherosclerosis (HCC)     Exudative age-related macular

## 2020-09-17 RX ORDER — DUTASTERIDE 0.5 MG/1
0.5 CAPSULE, LIQUID FILLED ORAL DAILY
Qty: 90 CAPSULE | Refills: 1 | Status: SHIPPED | OUTPATIENT
Start: 2020-09-17 | End: 2021-02-16

## 2020-09-17 NOTE — TELEPHONE ENCOUNTER
Last OARRS Review-0  Last Office Visit-08/11/2020  Return To [address] year  Next Appointment Date-0  Last Refill Date-03/05/2020  90 caps   Number of Refills Given- 1    Health Maintenance   Topic Date Due    Flu vaccine (1) 09/01/2020    DTaP/Tdap/Td vaccine (1 - Tdap) 10/01/2020 (Originally 3/5/1945)    Annual Wellness Visit (AWV)  08/11/2021 (Originally 6/18/2019)    Shingles Vaccine (1 of 2) 08/11/2021 (Originally 3/5/1976)    Lipid screen  10/02/2020    TSH testing  10/02/2020    Potassium monitoring  10/02/2020    Creatinine monitoring  10/02/2020    Pneumococcal 65+ years Vaccine  Completed    Hepatitis A vaccine  Aged Out    Hepatitis B vaccine  Aged Out    Hib vaccine  Aged Out    Meningococcal (ACWY) vaccine  Aged Out             (applicable per patient's age: Cancer Screenings, Depression Screening, Fall Risk Screening, Immunizations)    Hemoglobin A1C (%)   Date Value   10/15/2015 5.1   05/21/2013 5.6     LDL Cholesterol (mg/dL)   Date Value   10/02/2019 73     AST (U/L)   Date Value   10/02/2019 13     ALT (U/L)   Date Value   10/02/2019 7     BUN (mg/dL)   Date Value   10/02/2019 15      (goal A1C is < 7)   (goal LDL is <100) need 30-50% reduction from baseline     BP Readings from Last 3 Encounters:   08/11/20 110/77   03/03/20 124/84   10/01/19 128/80    (goal /80)      All Future Testing planned in [facility]:  Lab Frequency Next Occurrence   CBC Once 08/11/2020   Comprehensive Metabolic Panel Once 22/98/9799   TSH without Reflex Once 08/11/2020       Next Visit Date:  No future appointments.          Patient Active Problem List:     BPH (benign prostatic hyperplasia)     Hypertension     Hyperlipidemia     Hypothyroidism     CAD (coronary artery disease)     GERD (gastroesophageal reflux disease)     Alzheimer's dementia ([address]     History of DVT of lower extremity     Chronic kidney disease, stage 3 ([address]     Thoracic aortic atherosclerosis (HCC)     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

## 2020-10-16 ENCOUNTER — HOSPITAL ENCOUNTER (OUTPATIENT)
Age: 85
Setting detail: SPECIMEN
Discharge: HOME | End: 2020-10-16
Payer: MEDICARE

## 2020-10-16 LAB
ALBUMIN SERPL-MCNC: 4.1 G/DL (ref 3.5–5.2)
ALBUMIN/GLOBULIN RATIO: 2.2 (ref 1–2.5)
ALP BLD-CCNC: 88 U/L (ref 40–129)
ALT SERPL-CCNC: 8 U/L (ref 5–41)
ANION GAP SERPL CALCULATED.3IONS-SCNC: 12 MMOL/L (ref 9–17)
AST SERPL-CCNC: 13 U/L
BILIRUB SERPL-MCNC: 0.39 MG/DL (ref 0.3–1.2)
BUN BLDV-MCNC: 21 MG/DL (ref 8–23)
BUN/CREAT BLD: ABNORMAL (ref 9–20)
CALCIUM SERPL-MCNC: 10.2 MG/DL (ref 8.6–10.4)
CHLORIDE BLD-SCNC: 104 MMOL/L (ref 98–107)
CO2: 26 MMOL/L (ref 20–31)
CREAT SERPL-MCNC: 1.12 MG/DL (ref 0.7–1.2)
GFR AFRICAN AMERICAN: >60 ML/MIN
GFR NON-AFRICAN AMERICAN: >60 ML/MIN
GFR SERPL CREATININE-BSD FRML MDRD: ABNORMAL ML/MIN/{1.73_M2}
GFR SERPL CREATININE-BSD FRML MDRD: ABNORMAL ML/MIN/{1.73_M2}
GLUCOSE BLD-MCNC: 96 MG/DL (ref 70–99)
HCT VFR BLD CALC: 45.5 % (ref 40.7–50.3)
HEMOGLOBIN: 14.1 G/DL (ref 13–17)
MCH RBC QN AUTO: 29.4 PG (ref 25.2–33.5)
MCHC RBC AUTO-ENTMCNC: 31 G/DL (ref 28.4–34.8)
MCV RBC AUTO: 94.8 FL (ref 82.6–102.9)
NRBC AUTOMATED: 0 PER 100 WBC
PDW BLD-RTO: 14.1 % (ref 11.8–14.4)
PLATELET # BLD: 212 K/UL (ref 138–453)
PMV BLD AUTO: 10.9 FL (ref 8.1–13.5)
POTASSIUM SERPL-SCNC: 4.8 MMOL/L (ref 3.7–5.3)
RBC # BLD: 4.8 M/UL (ref 4.21–5.77)
SODIUM BLD-SCNC: 142 MMOL/L (ref 135–144)
TOTAL PROTEIN: 6 G/DL (ref 6.4–8.3)
TSH SERPL DL<=0.05 MIU/L-ACNC: 8.8 MIU/L (ref 0.3–5)
WBC # BLD: 7.5 K/UL (ref 3.5–11.3)

## 2020-10-19 RX ORDER — LEVOTHYROXINE SODIUM 88 UG/1
88 TABLET ORAL DAILY
Qty: 90 TABLET | Refills: 0 | Status: SHIPPED | OUTPATIENT
Start: 2020-10-19 | End: 2020-12-02

## 2020-12-02 RX ORDER — LEVOTHYROXINE SODIUM 88 UG/1
88 TABLET ORAL DAILY
Qty: 90 TABLET | Refills: 1 | Status: SHIPPED | OUTPATIENT
Start: 2020-12-02 | End: 2021-01-12 | Stop reason: SDUPTHER

## 2020-12-10 RX ORDER — PANTOPRAZOLE SODIUM 40 MG/1
40 TABLET, DELAYED RELEASE ORAL DAILY
Qty: 90 TABLET | Refills: 1 | Status: SHIPPED | OUTPATIENT
Start: 2020-12-10 | End: 2021-06-11 | Stop reason: SDUPTHER

## 2020-12-10 NOTE — TELEPHONE ENCOUNTER
Lov: 8/11/20  Lrf: 12/16/19  Na: 0  Health Maintenance   Topic Date Due    DTaP/Tdap/Td vaccine (1 - Tdap) 03/05/1945    Flu vaccine (1) 09/01/2020    Lipid screen  10/02/2020    Annual Wellness Visit (AWV)  08/11/2021 (Originally 6/18/2019)    Shingles Vaccine (1 of 2) 08/11/2021 (Originally 3/5/1976)    TSH testing  10/16/2021    Potassium monitoring  10/16/2021    Creatinine monitoring  10/16/2021    Pneumococcal 65+ years Vaccine  Completed    Hepatitis A vaccine  Aged Out    Hepatitis B vaccine  Aged Out    Hib vaccine  Aged Out    Meningococcal (ACWY) vaccine  Aged Out             (applicable per patient's age: Cancer Screenings, Depression Screening, Fall Risk Screening, Immunizations)    Hemoglobin A1C (%)   Date Value   10/15/2015 5.1   05/21/2013 5.6     LDL Cholesterol (mg/dL)   Date Value   10/02/2019 73     AST (U/L)   Date Value   10/16/2020 13     ALT (U/L)   Date Value   10/16/2020 8     BUN (mg/dL)   Date Value   10/16/2020 21      (goal A1C is < 7)   (goal LDL is <100) need 30-50% reduction from baseline     BP Readings from Last 3 Encounters:   08/11/20 110/77   03/03/20 124/84   10/01/19 128/80    (goal /80)      All Future Testing planned in [facility]:  Lab Frequency Next Occurrence       Next Visit Date:  No future appointments.          Patient Active Problem List:     BPH (benign prostatic hyperplasia)     Hypertension     Hyperlipidemia     Hypothyroidism     CAD (coronary artery disease)     GERD (gastroesophageal reflux disease)     Alzheimer's dementia ([address]     History of DVT of lower extremity     Chronic kidney disease, stage 3     Thoracic aortic atherosclerosis ([address]     Exudative age-related macular degeneration of both eyes with active choroidal neovascularization ([address]     Dry eyes

## 2020-12-16 RX ORDER — SILODOSIN 8 MG/1
8 CAPSULE ORAL EVERY EVENING
Qty: 90 CAPSULE | Refills: 1 | Status: SHIPPED | OUTPATIENT
Start: 2020-12-16 | End: 2021-02-12

## 2020-12-16 RX ORDER — MEMANTINE HYDROCHLORIDE 5 MG/1
5 TABLET ORAL 2 TIMES DAILY
Qty: 180 TABLET | Refills: 1 | Status: SHIPPED | OUTPATIENT
Start: 2020-12-16 | End: 2021-02-16

## 2020-12-16 RX ORDER — DONEPEZIL HYDROCHLORIDE 10 MG/1
TABLET, FILM COATED ORAL
Qty: 90 TABLET | Refills: 1 | Status: SHIPPED | OUTPATIENT
Start: 2020-12-16 | End: 2021-04-13 | Stop reason: SDUPTHER

## 2020-12-16 RX ORDER — LOVASTATIN 40 MG/1
80 TABLET ORAL NIGHTLY
Qty: 180 TABLET | Refills: 1 | Status: SHIPPED | OUTPATIENT
Start: 2020-12-16 | End: 2021-02-12

## 2020-12-16 NOTE — TELEPHONE ENCOUNTER
Patient's daughter called, stated her father's medications are \"lost in transit\" via delivery service. She spoke with the delivery service and was told they would over ride an emergency fill. Scripts pended, would like them sent to [facility].

## 2021-01-12 DIAGNOSIS — E03.9 HYPOTHYROIDISM, UNSPECIFIED TYPE: ICD-10-CM

## 2021-01-13 RX ORDER — LEVOTHYROXINE SODIUM 88 UG/1
88 TABLET ORAL DAILY
Qty: 90 TABLET | Refills: 3 | Status: SHIPPED | OUTPATIENT
Start: 2021-01-13 | End: 2021-11-22

## 2021-02-11 DIAGNOSIS — E78.2 MIXED HYPERLIPIDEMIA: ICD-10-CM

## 2021-02-11 DIAGNOSIS — N40.0 BENIGN NON-NODULAR PROSTATIC HYPERPLASIA WITHOUT LOWER URINARY TRACT SYMPTOMS: ICD-10-CM

## 2021-02-12 RX ORDER — LOVASTATIN 40 MG/1
40 TABLET ORAL NIGHTLY
Qty: 180 TABLET | Refills: 1 | Status: SHIPPED | OUTPATIENT
Start: 2021-02-12 | End: 2022-01-25

## 2021-02-12 RX ORDER — SILODOSIN 8 MG/1
8 CAPSULE ORAL EVERY EVENING
Qty: 90 CAPSULE | Refills: 1 | Status: SHIPPED | OUTPATIENT
Start: 2021-02-12 | End: 2021-08-23

## 2021-02-16 DIAGNOSIS — Z86.718 HISTORY OF DEEP VEIN THROMBOSIS (DVT) OF LOWER EXTREMITY: ICD-10-CM

## 2021-02-16 DIAGNOSIS — F02.80 LATE ONSET ALZHEIMER'S DISEASE WITHOUT BEHAVIORAL DISTURBANCE (HCC): ICD-10-CM

## 2021-02-16 DIAGNOSIS — G30.1 LATE ONSET ALZHEIMER'S DISEASE WITHOUT BEHAVIORAL DISTURBANCE (HCC): ICD-10-CM

## 2021-02-16 DIAGNOSIS — N40.0 BENIGN NON-NODULAR PROSTATIC HYPERPLASIA WITHOUT LOWER URINARY TRACT SYMPTOMS: ICD-10-CM

## 2021-02-16 NOTE — TELEPHONE ENCOUNTER
Last visit: 8-11-20  Last Med refill: 12-16-20  Does patient have enough medication for 72 hours: Yes    Next Visit Date:  No future appointments.     Health Maintenance   Topic Date Due    COVID-19 Vaccine (1 of 2) 03/05/1942    DTaP/Tdap/Td vaccine (1 - Tdap) 03/05/1945    Flu vaccine (1) 09/01/2020    Lipid screen  10/02/2020    Annual Wellness Visit (AWV)  08/11/2021 (Originally 6/18/2019)    Shingles Vaccine (1 of 2) 08/11/2021 (Originally 3/5/1976)    TSH testing  10/16/2021    Potassium monitoring  10/16/2021    Creatinine monitoring  10/16/2021    Pneumococcal 65+ years Vaccine  Completed    Hepatitis A vaccine  Aged Out    Hepatitis B vaccine  Aged Out    Hib vaccine  Aged Out    Meningococcal (ACWY) vaccine  Aged Out       Hemoglobin A1C (%)   Date Value   10/15/2015 5.1   05/21/2013 5.6             ( goal A1C is < 7)   No results found for: LABMICR  LDL Cholesterol (mg/dL)   Date Value   10/02/2019 73   05/03/2019 57       (goal LDL is <100)   AST (U/L)   Date Value   10/16/2020 13     ALT (U/L)   Date Value   10/16/2020 8     BUN (mg/dL)   Date Value   10/16/2020 21     BP Readings from Last 3 Encounters:   08/11/20 110/77   03/03/20 124/84   10/01/19 128/80          (goal 120/80)    All Future Testing planned in [facility]  Lab Frequency Next Occurrence               Patient Active Problem List:     BPH (benign prostatic hyperplasia)     Hypertension     Hyperlipidemia     Hypothyroidism     CAD (coronary artery disease)     GERD (gastroesophageal reflux disease)     Alzheimer's dementia ([facility]     History of DVT of lower extremity     Chronic kidney disease, stage 3     Thoracic aortic atherosclerosis (HCC)     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

## 2021-02-17 RX ORDER — DUTASTERIDE 0.5 MG/1
0.5 CAPSULE, LIQUID FILLED ORAL DAILY
Qty: 90 CAPSULE | Refills: 1 | Status: SHIPPED | OUTPATIENT
Start: 2021-02-17 | End: 2021-08-23

## 2021-02-17 RX ORDER — MEMANTINE HYDROCHLORIDE 5 MG/1
5 TABLET ORAL 2 TIMES DAILY
Qty: 180 TABLET | Refills: 1 | Status: SHIPPED | OUTPATIENT
Start: 2021-02-17 | End: 2021-07-21

## 2021-04-13 DIAGNOSIS — F02.80 LATE ONSET ALZHEIMER'S DISEASE WITHOUT BEHAVIORAL DISTURBANCE (HCC): ICD-10-CM

## 2021-04-13 DIAGNOSIS — G30.1 LATE ONSET ALZHEIMER'S DISEASE WITHOUT BEHAVIORAL DISTURBANCE (HCC): ICD-10-CM

## 2021-04-13 RX ORDER — DONEPEZIL HYDROCHLORIDE 10 MG/1
10 TABLET, FILM COATED ORAL NIGHTLY
Qty: 90 TABLET | Refills: 1 | Status: SHIPPED | OUTPATIENT
Start: 2021-04-13 | End: 2021-09-20

## 2021-06-02 ENCOUNTER — TELEPHONE (OUTPATIENT)
Dept: FAMILY MEDICINE CLINIC | Age: 86
End: 2021-06-02

## 2021-06-09 ENCOUNTER — TELEPHONE (OUTPATIENT)
Dept: FAMILY MEDICINE CLINIC | Age: 86
End: 2021-06-09

## 2021-06-09 DIAGNOSIS — K21.9 GASTROESOPHAGEAL REFLUX DISEASE WITHOUT ESOPHAGITIS: ICD-10-CM

## 2021-06-09 NOTE — TELEPHONE ENCOUNTER
Patients daughter in law called and stated that the medication Protonix was denied by [facility], and would like to know if anyone knows why.

## 2021-06-09 NOTE — TELEPHONE ENCOUNTER
Last visit:8/11/2020  Last Med refill: 12/10/2020  Does patient have enough medication for 72 hours: No:     Next Visit Date:  No future appointments.     Health Maintenance   Topic Date Due    COVID-19 Vaccine (1) Never done    DTaP/Tdap/Td vaccine (1 - Tdap) Never done    Lipid screen  10/02/2020    Annual Wellness Visit (AWV)  08/11/2021 (Originally 6/18/2019)    Shingles Vaccine (1 of 2) 08/11/2021 (Originally 3/5/1976)    Flu vaccine (Season Ended) 09/01/2021    TSH testing  10/16/2021    Potassium monitoring  10/16/2021    Creatinine monitoring  10/16/2021    Pneumococcal 65+ years Vaccine  Completed    Hepatitis A vaccine  Aged Out    Hepatitis B vaccine  Aged Out    Hib vaccine  Aged Out    Meningococcal (ACWY) vaccine  Aged Out       Hemoglobin A1C (%)   Date Value   10/15/2015 5.1   05/21/2013 5.6             ( goal A1C is < 7)   No results found for: LABMICR  LDL Cholesterol (mg/dL)   Date Value   10/02/2019 73   05/03/2019 57       (goal LDL is <100)   AST (U/L)   Date Value   10/16/2020 13     ALT (U/L)   Date Value   10/16/2020 8     BUN (mg/dL)   Date Value   10/16/2020 21     BP Readings from Last 3 Encounters:   08/11/20 110/77   03/03/20 124/84   10/01/19 128/80          (goal 120/80)    All Future Testing planned in [facility]  Lab Frequency Next Occurrence               Patient Active Problem List:     BPH (benign prostatic hyperplasia)     Hypertension     Hyperlipidemia     Hypothyroidism     CAD (coronary artery disease)     GERD (gastroesophageal reflux disease)     Alzheimer's dementia ([address]     History of DVT of lower extremity     Chronic kidney disease, stage 3 (HCC)     Thoracic aortic atherosclerosis ([address]     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

## 2021-06-11 RX ORDER — PANTOPRAZOLE SODIUM 40 MG/1
40 TABLET, DELAYED RELEASE ORAL DAILY
Qty: 90 TABLET | Refills: 0 | Status: SHIPPED | OUTPATIENT
Start: 2021-06-11 | End: 2021-08-23

## 2021-06-11 NOTE — TELEPHONE ENCOUNTER
I sent a 90-day supply to me on pharmacy. I reviewed documentation and I do not see anywhere where it was discontinued.

## 2021-07-21 DIAGNOSIS — G30.1 LATE ONSET ALZHEIMER'S DISEASE WITHOUT BEHAVIORAL DISTURBANCE (HCC): ICD-10-CM

## 2021-07-21 DIAGNOSIS — F02.80 LATE ONSET ALZHEIMER'S DISEASE WITHOUT BEHAVIORAL DISTURBANCE (HCC): ICD-10-CM

## 2021-07-21 NOTE — TELEPHONE ENCOUNTER
Last visit: 08/11/2020  Last Med refill: 02/17/2021  Does patient have enough medication for 72 hours: Yes    Next Visit Date:  No future appointments.     Health Maintenance   Topic Date Due    COVID-19 Vaccine (1) Never done    DTaP/Tdap/Td vaccine (1 - Tdap) Never done    Lipid screen  10/02/2020    Annual Wellness Visit (AWV)  08/11/2021 (Originally 6/18/2019)    Shingles Vaccine (1 of 2) 08/11/2021 (Originally 3/5/1976)    Flu vaccine (1) 09/01/2021    TSH testing  10/16/2021    Potassium monitoring  10/16/2021    Creatinine monitoring  10/16/2021    Pneumococcal 65+ years Vaccine  Completed    Hepatitis A vaccine  Aged Out    Hepatitis B vaccine  Aged Out    Hib vaccine  Aged Out    Meningococcal (ACWY) vaccine  Aged Out       Hemoglobin A1C (%)   Date Value   10/15/2015 5.1   05/21/2013 5.6             ( goal A1C is < 7)   No results found for: LABMICR  LDL Cholesterol (mg/dL)   Date Value   10/02/2019 73   05/03/2019 57       (goal LDL is <100)   AST (U/L)   Date Value   10/16/2020 13     ALT (U/L)   Date Value   10/16/2020 8     BUN (mg/dL)   Date Value   10/16/2020 21     BP Readings from Last 3 Encounters:   08/11/20 110/77   03/03/20 124/84   10/01/19 128/80          (goal 120/80)    All Future Testing planned in [facility]  Lab Frequency Next Occurrence               Patient Active Problem List:     BPH (benign prostatic hyperplasia)     Hypertension     Hyperlipidemia     Hypothyroidism     CAD (coronary artery disease)     GERD (gastroesophageal reflux disease)     Alzheimer's dementia ([address]     History of DVT of lower extremity     Chronic kidney disease, stage 3 (HCC)     Thoracic aortic atherosclerosis ([address]     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

## 2021-07-23 RX ORDER — MEMANTINE HYDROCHLORIDE 5 MG/1
5 TABLET ORAL 2 TIMES DAILY
Qty: 180 TABLET | Refills: 1 | Status: SHIPPED | OUTPATIENT
Start: 2021-07-23 | End: 2022-02-10

## 2021-08-22 DIAGNOSIS — K21.9 GASTROESOPHAGEAL REFLUX DISEASE WITHOUT ESOPHAGITIS: ICD-10-CM

## 2021-08-22 DIAGNOSIS — N40.0 BENIGN NON-NODULAR PROSTATIC HYPERPLASIA WITHOUT LOWER URINARY TRACT SYMPTOMS: ICD-10-CM

## 2021-08-23 RX ORDER — PANTOPRAZOLE SODIUM 40 MG/1
40 TABLET, DELAYED RELEASE ORAL DAILY
Qty: 90 TABLET | Refills: 1 | Status: SHIPPED | OUTPATIENT
Start: 2021-08-23 | End: 2022-03-10

## 2021-08-23 RX ORDER — SILODOSIN 8 MG/1
8 CAPSULE ORAL EVERY EVENING
Qty: 90 CAPSULE | Refills: 1 | Status: SHIPPED | OUTPATIENT
Start: 2021-08-23 | End: 2022-02-10

## 2021-08-23 RX ORDER — DUTASTERIDE 0.5 MG/1
0.5 CAPSULE, LIQUID FILLED ORAL DAILY
Qty: 90 CAPSULE | Refills: 1 | Status: SHIPPED | OUTPATIENT
Start: 2021-08-23 | End: 2022-02-10

## 2021-08-23 NOTE — TELEPHONE ENCOUNTER
Last visit: 08/11/2021  Last Med refill: 06/11/2021  Does patient have enough medication for 72 hours: Yes    Next Visit Date:  No future appointments.     Health Maintenance   Topic Date Due    COVID-19 Vaccine (1) Never done    DTaP/Tdap/Td vaccine (1 - Tdap) Never done    Shingles Vaccine (1 of 2) Never done   [employer] Visit (AWV)  Never done    Lipid screen  10/02/2020    Flu vaccine (1) 09/01/2021    TSH testing  10/16/2021    Potassium monitoring  10/16/2021    Creatinine monitoring  10/16/2021    Pneumococcal 65+ years Vaccine  Completed    Hepatitis A vaccine  Aged Out    Hepatitis B vaccine  Aged Out    Hib vaccine  Aged Out    Meningococcal (ACWY) vaccine  Aged Out       Hemoglobin A1C (%)   Date Value   10/15/2015 5.1   05/21/2013 5.6             ( goal A1C is < 7)   No results found for: LABMICR  LDL Cholesterol (mg/dL)   Date Value   10/02/2019 73   05/03/2019 57       (goal LDL is <100)   AST (U/L)   Date Value   10/16/2020 13     ALT (U/L)   Date Value   10/16/2020 8     BUN (mg/dL)   Date Value   10/16/2020 21     BP Readings from Last 3 Encounters:   08/11/20 110/77   03/03/20 124/84   10/01/19 128/80          (goal 120/80)    All Future Testing planned in [facility]  Lab Frequency Next Occurrence               Patient Active Problem List:     BPH (benign prostatic hyperplasia)     Hypertension     Hyperlipidemia     Hypothyroidism     CAD (coronary artery disease)     GERD (gastroesophageal reflux disease)     Alzheimer's dementia ([address]     History of DVT of lower extremity     Chronic kidney disease, stage 3 (HCC)     Thoracic aortic atherosclerosis ([address]     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

## 2021-09-20 DIAGNOSIS — F02.80 LATE ONSET ALZHEIMER'S DISEASE WITHOUT BEHAVIORAL DISTURBANCE (HCC): ICD-10-CM

## 2021-09-20 DIAGNOSIS — G30.1 LATE ONSET ALZHEIMER'S DISEASE WITHOUT BEHAVIORAL DISTURBANCE (HCC): ICD-10-CM

## 2021-09-20 NOTE — TELEPHONE ENCOUNTER
Last visit: 08/11/2020  Last Med refill: 04/13/2021  Does patient have enough medication for 72 hours: Yes    Next Visit Date:  No future appointments.     Health Maintenance   Topic Date Due    COVID-19 Vaccine (1) Never done    DTaP/Tdap/Td vaccine (1 - Tdap) Never done    Shingles Vaccine (1 of 2) Never done   [employer] Visit (AWV)  Never done    Lipid screen  10/02/2020    Flu vaccine (1) 09/01/2021    TSH testing  10/16/2021    Potassium monitoring  10/16/2021    Creatinine monitoring  10/16/2021    Pneumococcal 65+ years Vaccine  Completed    Hepatitis A vaccine  Aged Out    Hepatitis B vaccine  Aged Out    Hib vaccine  Aged Out    Meningococcal (ACWY) vaccine  Aged Out       Hemoglobin A1C (%)   Date Value   10/15/2015 5.1   05/21/2013 5.6             ( goal A1C is < 7)   No results found for: LABMICR  LDL Cholesterol (mg/dL)   Date Value   10/02/2019 73   05/03/2019 57       (goal LDL is <100)   AST (U/L)   Date Value   10/16/2020 13     ALT (U/L)   Date Value   10/16/2020 8     BUN (mg/dL)   Date Value   10/16/2020 21     BP Readings from Last 3 Encounters:   08/11/20 110/77   03/03/20 124/84   10/01/19 128/80          (goal 120/80)    All Future Testing planned in [facility]  Lab Frequency Next Occurrence               Patient Active Problem List:     BPH (benign prostatic hyperplasia)     Hypertension     Hyperlipidemia     Hypothyroidism     CAD (coronary artery disease)     GERD (gastroesophageal reflux disease)     Alzheimer's dementia ([address]     History of DVT of lower extremity     Chronic kidney disease, stage 3 (HCC)     Thoracic aortic atherosclerosis (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

## 2021-09-21 RX ORDER — DONEPEZIL HYDROCHLORIDE 10 MG/1
10 TABLET, FILM COATED ORAL NIGHTLY
Qty: 90 TABLET | Refills: 1 | Status: SHIPPED | OUTPATIENT
Start: 2021-09-21 | End: 2022-03-29

## 2021-10-26 DIAGNOSIS — Z86.718 HISTORY OF DEEP VEIN THROMBOSIS (DVT) OF LOWER EXTREMITY: ICD-10-CM

## 2021-10-26 NOTE — TELEPHONE ENCOUNTER
Last visit: 08/11/2020  Last Med refill: 02-  Does patient have enough medication for 72 hours: No:     Next Visit Date:  No future appointments.     Health Maintenance   Topic Date Due    COVID-19 Vaccine (1) Never done    DTaP/Tdap/Td vaccine (1 - Tdap) Never done    Shingles Vaccine (1 of 2) Never done   [employer] Visit (AWV)  Never done    Lipid screen  10/02/2020    Flu vaccine (1) 09/01/2021    TSH testing  10/16/2021    Potassium monitoring  10/16/2021    Creatinine monitoring  10/16/2021    Pneumococcal 65+ years Vaccine  Completed    Hepatitis A vaccine  Aged Out    Hepatitis B vaccine  Aged Out    Hib vaccine  Aged Out    Meningococcal (ACWY) vaccine  Aged Out       Hemoglobin A1C (%)   Date Value   10/15/2015 5.1   05/21/2013 5.6             ( goal A1C is < 7)   No results found for: LABMICR  LDL Cholesterol (mg/dL)   Date Value   10/02/2019 73   05/03/2019 57       (goal LDL is <100)   AST (U/L)   Date Value   10/16/2020 13     ALT (U/L)   Date Value   10/16/2020 8     BUN (mg/dL)   Date Value   10/16/2020 21     BP Readings from Last 3 Encounters:   08/11/20 110/77   03/03/20 124/84   10/01/19 128/80          (goal 120/80)    All Future Testing planned in [facility]  Lab Frequency Next Occurrence               Patient Active Problem List:     BPH (benign prostatic hyperplasia)     Hypertension     Hyperlipidemia     Hypothyroidism     CAD (coronary artery disease)     GERD (gastroesophageal reflux disease)     Alzheimer's dementia ([address]     History of DVT of lower extremity     Chronic kidney disease, stage 3 (HCC)     Thoracic aortic atherosclerosis ([address]     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

## 2021-11-19 DIAGNOSIS — E03.9 HYPOTHYROIDISM, UNSPECIFIED TYPE: ICD-10-CM

## 2021-11-22 RX ORDER — LEVOTHYROXINE SODIUM 88 MCG
TABLET ORAL
Qty: 90 TABLET | Refills: 3 | Status: SHIPPED | OUTPATIENT
Start: 2021-11-22 | End: 2021-12-08 | Stop reason: DRUGHIGH

## 2021-11-22 NOTE — TELEPHONE ENCOUNTER
Last visit: 8/11/20  Last Med refill: 1/13/21    Next Visit Date:  Future Appointments   Date Time Provider Treasure De   12/7/2021  1:45 PM YEVGENIY Pereira NP [facility] [address] Maintenance   Topic Date Due    COVID-19 Vaccine (1) Never done    DTaP/Tdap/Td vaccine (1 - Tdap) Never done    Shingles Vaccine (1 of 2) Never done   [employer] Visit (AWV)  Never done    Lipid screen  10/02/2020    Flu vaccine (1) 09/01/2021    TSH testing  10/16/2021    Potassium monitoring  10/16/2021    Creatinine monitoring  10/16/2021    Pneumococcal 65+ years Vaccine  Completed    Hepatitis A vaccine  Aged Out    Hepatitis B vaccine  Aged Out    Hib vaccine  Aged Out    Meningococcal (ACWY) vaccine  Aged Out       Hemoglobin A1C (%)   Date Value   10/15/2015 5.1   05/21/2013 5.6             ( goal A1C is < 7)   No results found for: LABMICR  LDL Cholesterol (mg/dL)   Date Value   10/02/2019 73   05/03/2019 57       (goal LDL is <100)   AST (U/L)   Date Value   10/16/2020 13     ALT (U/L)   Date Value   10/16/2020 8     BUN (mg/dL)   Date Value   10/16/2020 21     BP Readings from Last 3 Encounters:   08/11/20 110/77   03/03/20 124/84   10/01/19 128/80          (goal 120/80)    All Future Testing planned in [facility]  Lab Frequency Next Occurrence               Patient Active Problem List:     BPH (benign prostatic hyperplasia)     Hypertension     Hyperlipidemia     Hypothyroidism     CAD (coronary artery disease)     GERD (gastroesophageal reflux disease)     Alzheimer's dementia ([address]     History of DVT of lower extremity     Chronic kidney disease, stage 3 (HCC)     Thoracic aortic atherosclerosis ([address]     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

## 2021-12-07 ENCOUNTER — HOSPITAL ENCOUNTER (OUTPATIENT)
Age: 86
Setting detail: SPECIMEN
Discharge: HOME | End: 2021-12-07

## 2021-12-07 ENCOUNTER — OFFICE VISIT (OUTPATIENT)
Dept: FAMILY MEDICINE CLINIC | Age: 86
End: 2021-12-07
Payer: MEDICARE

## 2021-12-07 VITALS
SYSTOLIC BLOOD PRESSURE: 132 MMHG | HEART RATE: 67 BPM | DIASTOLIC BLOOD PRESSURE: 74 MMHG | WEIGHT: 193 LBS | BODY MASS INDEX: 32.15 KG/M2 | OXYGEN SATURATION: 98 % | TEMPERATURE: 97.4 F | HEIGHT: 65 IN

## 2021-12-07 DIAGNOSIS — E03.9 HYPOTHYROIDISM, UNSPECIFIED TYPE: ICD-10-CM

## 2021-12-07 DIAGNOSIS — N18.30 STAGE 3 CHRONIC KIDNEY DISEASE, UNSPECIFIED WHETHER STAGE 3A OR 3B CKD (HCC): ICD-10-CM

## 2021-12-07 DIAGNOSIS — I70.0 THORACIC AORTIC ATHEROSCLEROSIS (HCC): ICD-10-CM

## 2021-12-07 DIAGNOSIS — G30.1 LATE ONSET ALZHEIMER'S DISEASE WITHOUT BEHAVIORAL DISTURBANCE (HCC): ICD-10-CM

## 2021-12-07 DIAGNOSIS — H35.3231 EXUDATIVE AGE-RELATED MACULAR DEGENERATION OF BOTH EYES WITH ACTIVE CHOROIDAL NEOVASCULARIZATION (HCC): ICD-10-CM

## 2021-12-07 DIAGNOSIS — I10 PRIMARY HYPERTENSION: ICD-10-CM

## 2021-12-07 DIAGNOSIS — Z23 NEED FOR INFLUENZA VACCINATION: ICD-10-CM

## 2021-12-07 DIAGNOSIS — F02.80 LATE ONSET ALZHEIMER'S DISEASE WITHOUT BEHAVIORAL DISTURBANCE (HCC): ICD-10-CM

## 2021-12-07 DIAGNOSIS — Z00.00 ROUTINE GENERAL MEDICAL EXAMINATION AT A HEALTH CARE FACILITY: Primary | ICD-10-CM

## 2021-12-07 LAB
ANION GAP SERPL CALCULATED.3IONS-SCNC: 11 MMOL/L (ref 9–17)
BUN BLDV-MCNC: 15 MG/DL (ref 8–23)
BUN/CREAT BLD: NORMAL (ref 9–20)
CALCIUM SERPL-MCNC: 9.6 MG/DL (ref 8.6–10.4)
CHLORIDE BLD-SCNC: 105 MMOL/L (ref 98–107)
CO2: 21 MMOL/L (ref 20–31)
CREAT SERPL-MCNC: 1.11 MG/DL (ref 0.7–1.2)
GFR AFRICAN AMERICAN: >60 ML/MIN
GFR NON-AFRICAN AMERICAN: >60 ML/MIN
GFR SERPL CREATININE-BSD FRML MDRD: NORMAL ML/MIN/{1.73_M2}
GFR SERPL CREATININE-BSD FRML MDRD: NORMAL ML/MIN/{1.73_M2}
GLUCOSE BLD-MCNC: 91 MG/DL (ref 70–99)
HCT VFR BLD CALC: 36.5 % (ref 40.7–50.3)
HEMOGLOBIN: 10.3 G/DL (ref 13–17)
MCH RBC QN AUTO: 20.9 PG (ref 25.2–33.5)
MCHC RBC AUTO-ENTMCNC: 28.2 G/DL (ref 28.4–34.8)
MCV RBC AUTO: 74 FL (ref 82.6–102.9)
NRBC AUTOMATED: 0 PER 100 WBC
PDW BLD-RTO: 17.5 % (ref 11.8–14.4)
PLATELET # BLD: 230 K/UL (ref 138–453)
PMV BLD AUTO: 11.4 FL (ref 8.1–13.5)
POTASSIUM SERPL-SCNC: 4.6 MMOL/L (ref 3.7–5.3)
RBC # BLD: 4.93 M/UL (ref 4.21–5.77)
SODIUM BLD-SCNC: 137 MMOL/L (ref 135–144)
TSH SERPL DL<=0.05 MIU/L-ACNC: 6.54 MIU/L (ref 0.3–5)
WBC # BLD: 10.6 K/UL (ref 3.5–11.3)

## 2021-12-07 PROCEDURE — 4040F PNEUMOC VAC/ADMIN/RCVD: CPT | Performed by: NURSE PRACTITIONER

## 2021-12-07 PROCEDURE — G0008 ADMIN INFLUENZA VIRUS VAC: HCPCS | Performed by: NURSE PRACTITIONER

## 2021-12-07 PROCEDURE — G8484 FLU IMMUNIZE NO ADMIN: HCPCS | Performed by: NURSE PRACTITIONER

## 2021-12-07 PROCEDURE — G0438 PPPS, INITIAL VISIT: HCPCS | Performed by: NURSE PRACTITIONER

## 2021-12-07 PROCEDURE — 1123F ACP DISCUSS/DSCN MKR DOCD: CPT | Performed by: NURSE PRACTITIONER

## 2021-12-07 PROCEDURE — 90694 VACC AIIV4 NO PRSRV 0.5ML IM: CPT | Performed by: NURSE PRACTITIONER

## 2021-12-07 SDOH — ECONOMIC STABILITY: FOOD INSECURITY: WITHIN THE PAST 12 MONTHS, YOU WORRIED THAT YOUR FOOD WOULD RUN OUT BEFORE YOU GOT MONEY TO BUY MORE.: NEVER TRUE

## 2021-12-07 SDOH — ECONOMIC STABILITY: FOOD INSECURITY: WITHIN THE PAST 12 MONTHS, THE FOOD YOU BOUGHT JUST DIDN'T LAST AND YOU DIDN'T HAVE MONEY TO GET MORE.: NEVER TRUE

## 2021-12-07 ASSESSMENT — PATIENT HEALTH QUESTIONNAIRE - PHQ9
SUM OF ALL RESPONSES TO PHQ9 QUESTIONS 1 & 2: 0
SUM OF ALL RESPONSES TO PHQ QUESTIONS 1-9: 0
2. FEELING DOWN, DEPRESSED OR HOPELESS: 0
2. FEELING DOWN, DEPRESSED OR HOPELESS: 0
1. LITTLE INTEREST OR PLEASURE IN DOING THINGS: 0
1. LITTLE INTEREST OR PLEASURE IN DOING THINGS: 0
DEPRESSION UNABLE TO ASSESS: PT REFUSES
SUM OF ALL RESPONSES TO PHQ QUESTIONS 1-9: 0
SUM OF ALL RESPONSES TO PHQ9 QUESTIONS 1 & 2: 0

## 2021-12-07 ASSESSMENT — LIFESTYLE VARIABLES: HOW OFTEN DO YOU HAVE A DRINK CONTAINING ALCOHOL: 0

## 2021-12-07 ASSESSMENT — SOCIAL DETERMINANTS OF HEALTH (SDOH): HOW HARD IS IT FOR YOU TO PAY FOR THE VERY BASICS LIKE FOOD, HOUSING, MEDICAL CARE, AND HEATING?: NOT HARD AT ALL

## 2021-12-07 NOTE — PATIENT INSTRUCTIONS
Personalized Preventive Plan for Lucero Serrano - 12/7/2021  Medicare offers a range of preventive health benefits. Some of the tests and screenings are paid in full while other may be subject to a deductible, co-insurance, and/or copay. Some of these benefits include a comprehensive review of your medical history including lifestyle, illnesses that may run in your family, and various assessments and screenings as appropriate. After reviewing your medical record and screening and assessments performed today your provider may have ordered immunizations, labs, imaging, and/or referrals for you. A list of these orders (if applicable) as well as your Preventive Care list are included within your After Visit Summary for your review. Other Preventive Recommendations:    · A preventive eye exam performed by an eye specialist is recommended every 1-2 years to screen for glaucoma; cataracts, macular degeneration, and other eye disorders. · A preventive dental visit is recommended every 6 months. · Try to get at least 150 minutes of exercise per week or 10,000 steps per day on a pedometer . · Order or download the FREE \"Exercise & Physical Activity: Your Everyday Guide\" from [employer]. Call 0-856.181.4410 or search [employer] online. · You need 6963-6651 mg of calcium and 8145-5908 IU of vitamin D per day. It is possible to meet your calcium requirement with diet alone, but a vitamin D supplement is usually necessary to meet this goal.  · When exposed to the sun, use a sunscreen that protects against both UVA and UVB radiation with an SPF of 30 or greater. Reapply every 2 to 3 hours or after sweating, drying off with a towel, or swimming. · Always wear a seat belt when traveling in a car. Always wear a helmet when riding a bicycle or motorcycle.   Patient Education        Influenza (Flu): Care Instructions  Overview     Influenza (flu) is an infection in the lungs and breathing passages. It is caused by the influenza virus. There are different strains, or types, of the flu virus from year to year. Unlike the common cold, the flu comes on suddenly and the symptoms can be more severe. These symptoms include a cough, congestion, fever, chills, fatigue, aches, and pains. These symptoms may last up to 10 days. Although the flu can make you feel very sick, it usually doesn't cause serious health problems. Home treatment is usually all you need for flu symptoms. But your doctor may prescribe antiviral medicine to prevent other health problems, such as pneumonia, from developing. The risk of other health problems from the flu is highest for young children (under 2), older adults (over 72), pregnant women, and people with long-term health conditions. Follow-up care is a key part of your treatment and safety. Be sure to make and go to all appointments, and call your doctor if you are having problems. It's also a good idea to know your test results and keep a list of the medicines you take. How can you care for yourself at home? Get plenty of rest.  Drink plenty of fluids. If you have to limit fluids because of a health problem, talk with your doctor before you increase the amount of fluids you drink. Take an over-the-counter pain medicine if needed, such as acetaminophen (Tylenol), ibuprofen (Advil, Motrin), or naproxen (Aleve), to relieve fever, headache, and muscle aches. Be safe with medicines. Read and follow all instructions on the label. No one younger than 20 should take aspirin. It has been linked to Reye syndrome, a serious illness. Take any prescribed medicine exactly as directed. Do not smoke. Smoking can make the flu worse. If you need help quitting, talk to your doctor about stop-smoking programs and medicines. These can increase your chances of quitting for good.   If the skin around your nose and lips becomes sore, put some petroleum jelly (such as Vaseline) on the area.  To ease coughing:  Suck on cough drops or plain, hard candy. Try an over-the-counter cough or cold medicine. Read and follow all instructions on the label. Raise your head at night with an extra pillow. This may help you rest if coughing keeps you awake. To avoid spreading the flu  Wash your hands regularly, and keep your hands away from your face. Stay home from school, work, and other public places until you are feeling better and your fever has been gone for at least 24 hours. The fever needs to have gone away on its own without the help of medicine. Ask people living with you to talk to their doctors about preventing the flu. They may get antiviral medicine to keep from getting the flu from you. To prevent the flu in the future, get the flu vaccine every fall. Encourage people living with you to get the vaccine. Cover your mouth when you cough or sneeze. If you can, cough or sneeze into the bend of your elbow, not your hands. When should you call for help? Call 911 anytime you think you may need emergency care. For example, call if:    You have severe trouble breathing.     You have a seizure. Call your doctor now or seek immediate medical care if:    You have new or worse trouble breathing.     You have pain or pressure in your chest or belly.     You have a fever or cough that returns after getting better.     You feel very sleepy, dizzy, or confused.     You are not urinating.     You have severe muscle pain.     You have severe weakness, or you are unsteady.     You have medical conditions that are getting worse. Watch closely for changes in your health, and be sure to contact your doctor if:    You do not get better as expected.     You are having a problem with your medicine. Where can you learn more? Go to [email or web address] and sign in to your [employer] account.  Enter E135 in the [employer] box to learn more about \"Influenza (Flu): Care Instructions. \"     If you do not have an account, please click on the \"Sign Up Now\" link. Current as of: July 6, 2021               Content Version: 13.0  © 2006-2021 Healthwise, [employer]. Care instructions adapted under license by [facility] ([facility]). If you have questions about a medical condition or this instruction, always ask your healthcare professional. [address] any warranty or liability for your use of this information. Patient Education        influenza virus vaccine (injection)  Pronunciation:  IN floo EN za TEJ COLÓNK seen  Brand:  Afluria PF Pediatric Quadrivalent 5963-0833, Afluria PF Quadrivalent [id number], Afluria Quadrivalent 7319-9636, Fluad PF 9211-6416, Fluad Quadrivalent PF 1020-2635, Fluarix PF Quadrivalent 8878-2187, Flublok Quadrivalent 0819-7442, Flucelvax PF Quadrivalent 7245-5967, Flucelvax Quadrivalent 1310-8322, FluLaval PF Quadrivalent 7401-9234, Fluzone High-Dose Quadrivalent PF 5473-7996, Fluzone PF Quadrivalent 2870-3260, Fluzone Quadrivalent 5789-4845  What is the most important information I should know about this vaccine? The injectable influenza virus vaccine (flu shot) is a \"killed virus\" vaccine. Influenza virus vaccine is also available in a nasal spray form, which is a \"live virus\" vaccine. This medication guide addresses only the injectable form of this vaccine. Becoming infected with influenza is much more dangerous to your health than receiving this vaccine. However, like any medicine, this vaccine can cause side effects but the risk of serious side effects is extremely low. What is influenza virus vaccine? Influenza virus (commonly known as \"the flu\") is a serious disease caused by a virus. Influenza virus can spread from one person to another through small droplets of saliva that are expelled into the air when an infected person coughs or sneezes.  The virus can also be passed through contact with objects the infected person has touched, such as a door handle or other surfaces. Influenza virus vaccine is used to prevent infection caused by influenza virus. The vaccine is redeveloped each year to contain specific strains of inactivated (killed) flu virus that are recommended by public health officials for that year. The injectable influenza virus vaccine (flu shot) is a \"killed virus\" vaccine. Influenza virus vaccine is also available in a nasal spray form, which is a \"live virus\" vaccine. Influenza virus vaccine works by exposing you to a small dose of the virus, which helps your body to develop immunity to the disease. Influenza virus vaccine will not treat an active infection that has already developed in the body. Influenza virus vaccine is for use in adults and children who are at least 7 months old. Becoming infected with influenza is much more dangerous to your health than receiving this vaccine. Influenza causes thousands of deaths each year, and hundreds of thousands of hospitalizations. However, like any medicine, this vaccine can cause side effects but the risk of serious side effects is extremely low. Like any vaccine, influenza virus vaccine may not provide protection from disease in every person. This vaccine will not prevent illness caused by daniel flu (\"bird flu\"). What should I discuss with my healthcare provider before receiving this vaccine? You may not be able to receive this vaccine if you are allergic to eggs, or if you have:  a history of severe allergic reaction to a flu vaccine; or  a history of Guillain-[place] syndrome (within 6 weeks after receiving a flu vaccine). Tell your doctor if you have ever had:  bleeding problems;  a neurologic disorder or disease affecting the brain (or if this was a reaction to a previous vaccine);  seizures;  a weak immune system caused by disease, bone marrow transplant, or by using certain medicines or receiving cancer treatments; or  an allergy to latex.   You can still receive a vaccine if you have a minor cold. If you have a severe illness with a fever or any type of infection, wait until you get better before receiving this vaccine. The Centers for Disease Control and Prevention recommends that pregnant women get a flu shot during any trimester of pregnancy to protect themselves and their  babies from flu. The nasal spray form of influenza vaccine is not recommended for use in pregnant women. It may not be safe to breastfeed while using this medicine. Ask your doctor about any risk. This vaccine should not be given to a child younger than 7 months old. How is this vaccine given? Some brands of this vaccine are made for use in adults and not in children. Your child's doctor can recommend the best influenza virus vaccine for your child. This vaccine is given as an injection (shot) into a muscle. You will receive this injection in a doctor's office or other clinic setting. You should receive a flu vaccine every year. Your immunity will gradually decrease over the 12 months after you receive the influenza virus vaccine. Children receiving this vaccine may need a booster shot one month after receiving the first vaccine. The influenza virus vaccine is usually given in October or November. Some people may need to have their vaccines earlier or later. Follow your doctor's instructions. Your doctor may recommend treating fever and pain with an aspirin-free pain reliever such as acetaminophen (Tylenol) or ibuprofen (Motrin, Advil, and others) when the shot is given and for the next 24 hours. Follow the label directions or your doctor's instructions about how much of this medicine to give your child. It is especially important to prevent fever from occurring in a child who has a seizure disorder such as epilepsy. What happens if I miss a dose? Since flu shots are usually given only one time per year, you will most likely not be on a dosing schedule.  Call your doctor if you forget to receive your yearly flu shot in October or November. If your child misses a booster dose of this vaccine, call your doctor for instructions. What happens if I overdose? An overdose of this vaccine is unlikely to occur. What should I avoid before or after receiving this vaccine? Follow your doctor's instructions about any restrictions on food, beverages, or activity. What are the possible side effects of influenza virus injectable vaccine? Get emergency medical help if you have signs of an allergic reaction: hives; difficulty breathing; swelling of your face, lips, tongue, or throat. You should not receive a booster vaccine if you had a life-threatening allergic reaction after the first shot. Keep track of any and all side effects you have after receiving this vaccine. If you ever need to receive influenza virus vaccine in the future, you will need to tell your doctor if the previous shot caused any side effects. Influenza virus injectable (killed virus) vaccine will not cause you to become ill with the flu virus that it contains. However, you may have flu-like symptoms at any time during flu season that may be caused by other strains of influenza virus. Call your doctor at once if you have:  a light-headed feeling, like you might pass out;  severe weakness or unusual feeling in your arms and legs (may occur 2 to 4 weeks after you receive the vaccine);  high fever;  seizure (convulsions); or  unusual bleeding. Common side effects may include:  low fever, chills;  mild fussiness or crying;  redness, bruising, pain, swelling, or a lump where the vaccine was injected;  headache, tired feeling; or  joint or muscle pain. This is not a complete list of side effects and others may occur. Call your doctor for medical advice about side effects. You may report vaccine side effects to the [place] [address] and Human Services at [phone].   What other drugs will affect influenza virus injectable vaccine? If you are using any of these medications, you may not be able to receive the vaccine, or may need to wait until the other treatments are finished:  phenytoin, theophylline, or warfarin (Coumadin, Jantoven);  an oral, nasal, inhaled, or injectable steroid medicine;  medications to treat psoriasis, rheumatoid arthritis, or other autoimmune disorders--azathioprine, etanercept, leflunomide, and others; or  medicines to treat or prevent organ transplant rejection--basiliximab, cyclosporine, muromonab-CD3, mycophenolate mofetil, sirolimus, tacrolimus. This list is not complete. Other drugs may affect influenza virus vaccine, including prescription and over-the-counter medicines, vitamins, and herbal products. Not all possible drug interactions are listed here. Where can I get more information? Your doctor or pharmacist can provide more information about this vaccine. Additional information is available from your local health department or the Centers for Disease Control and Prevention. Remember, keep this and all other medicines out of the reach of children, never share your medicines with others, and use this medication only for the indication prescribed. Every effort has been made to ensure that the information provided by [address] is accurate, up-to-date, and complete, but no guarantee is made to that effect. Drug information contained herein may be time sensitive. [facility] information has been compiled for use by healthcare practitioners and consumers in the [place] and therefore [facility] does not warrant that uses outside of the [place] are appropriate, unless specifically indicated otherwise. [facility] drug information does not endorse drugs, diagnose patients or recommend therapy.  [facility]'s drug information is an informational resource designed to assist licensed healthcare practitioners in caring for their patients and/or to serve consumers viewing this service as a supplement to, and not a substitute for, the expertise, skill, knowledge and judgment of healthcare practitioners. The absence of a warning for a given drug or drug combination in no way should be construed to indicate that the drug or drug combination is safe, effective or appropriate for any given patient. [facility] does not assume any responsibility for any aspect of healthcare administered with the aid of information [facility] provides. The information contained herein is not intended to cover all possible uses, directions, precautions, warnings, drug interactions, allergic reactions, or adverse effects. If you have questions about the drugs you are taking, check with your doctor, nurse or pharmacist.  Copyright 9052-0728 [address]. Version: 8.03. Revision date: 8/19/2020. Care instructions adapted under license by [facility] ([facility]). If you have questions about a medical condition or this instruction, always ask your healthcare professional. [place] [address] any warranty or liability for your use of this information.

## 2021-12-07 NOTE — PROGRESS NOTES
Vaccine Information Sheet, \"Influenza - Inactivated\"  given to Jazmin Fajardo, or parent/legal guardian of  Jazmin Fajardo and verbalized understanding. Patient responses:    Have you ever had a reaction to a flu vaccine? No  Do you have any current illness? No  Have you ever had Guillian [place] Syndrome? No  Do you have a serious allergy to any of the following: Neomycin, Polymyxin, Thimerosal, eggs or egg products? No    Flu vaccine given per order. Please see immunization tab. Risks and benefits explained. Current VIS given.       Immunizations Administered     Name Date Dose Route    Influenza, Quadv, adjuvanted, 65 yrs +, IM, PF (Fluad) 12/7/2021 0.5 mL Intramuscular    Site: Deltoid- Right    Lot: [id number]    NDC: 28386-215-64 independent

## 2021-12-07 NOTE — PROGRESS NOTES
Medicare Annual Wellness Visit  Name: Mansoor Nelson Date: 2021   MRN: [id number] Sex: Male   Age: 80 y.o. Ethnicity: Non- / Non    : 3/5/1926 Race: White (non-)      Radha Otero is here for Medicare AWV and Annual Exam    Screenings for behavioral, psychosocial and functional/safety risks, and cognitive dysfunction are all negative except as indicated below. These results, as well as other patient data from the [address] [facility], [place], operated by [facility] form, are documented in Flowsheets linked to this Encounter. No Known Allergies      Prior to Visit Medications    Medication Sig Taking? Authorizing Provider   apixaban (ELIQUIS) 2.5 MG TABS tablet Take 1 tablet by mouth 2 times daily Yes YEVGENIY Iraheta NP   donepezil (ARICEPT) 10 MG tablet Take 1 tablet by mouth nightly Yes YEVGENIY Iraheta NP   dutasteride (AVODART) 0.5 MG capsule Take 1 capsule by mouth daily Yes YEVGENIY De Leon CNP   silodosin (RAPAFLO) 8 MG CAPS Take 1 capsule by mouth every evening Yes YEVGENIY De Leon CNP   pantoprazole (PROTONIX) 40 MG tablet Take 1 tablet by mouth daily Yes YEVGENIY De Leon CNP   memantine (NAMENDA) 5 MG tablet Take 1 tablet by mouth 2 times daily Yes YEVGENIY Iraheta NP   lovastatin (MEVACOR) 40 MG tablet Take 1 tablet by mouth nightly Yes YEVGENIY Iraheta NP   Calcium-Vitamin D (CALTRATE 600 PLUS-VIT D PO) Take  by mouth.    Yes Historical Provider, MD   levothyroxine (SYNTHROID) 100 MCG tablet Take 1 tablet by mouth daily  YEVGENIY Iraheta NP   ferrous sulfate (IRON 325) 325 (65 Fe) MG tablet Take 1 tablet by mouth 2 times daily (with meals)  YEVGENIY Iraheta NP   docusate sodium (COLACE) 100 MG capsule Take 1 capsule by mouth daily  YEVGENIY Iraheta NP   potassium citrate (UROCIT-K) 10 MEQ (1080 MG) extended release tablet Take 3 tablets by mouth 2 times daily  YEVGENIY South CNP         Past Medical History:   Diagnosis Date    BPH (benign prostatic hyperplasia)     CAD (coronary artery disease)     Deep venous thrombosis of leg (HCC)     Hyperlipidemia     Hypertension     Hypothyroidism     Pulmonary embolism (HCC)     Rheumatoid arthritis(714.0)        Past Surgical History:   Procedure Laterality Date    CARDIAC CATHETERIZATION  2008    CATARACT REMOVAL WITH IMPLANT           Family History   Problem Relation Age of Onset    Diabetes Brother        CareTeam (Including outside providers/suppliers regularly involved in providing care):   Patient Care Team:  YEVGENIY Banks NP as PCP - General (Nurse Practitioner)  YEVGENIY Banks NP as PCP - [facility] Empaneled Provider    Wt Readings from Last 3 Encounters:   12/07/21 193 lb (87.5 kg)   08/11/20 172 lb (78 kg)   03/03/20 176 lb (79.8 kg)     Vitals:    12/07/21 1355   BP: 132/74   Pulse: 67   Temp: 97.4 °F (36.3 °C)   TempSrc: Tympanic   SpO2: 98%   Weight: 193 lb (87.5 kg)   Height: 5' 4.5\" (1.638 m)     Body mass index is 32.62 kg/m². Based upon direct observation of the patient, evaluation of cognition reveals global memory impairment noted. Per son report    Patient's complete Health Risk Assessment and screening values have been reviewed and are found in Flowsheets. The following problems were reviewed today and where indicated follow up appointments were made and/or referrals ordered. Positive Risk Factor Screenings with Interventions:            General Health and ACP:  General  In general, how would you say your health is?: Good  In the past 7 days, have you experienced any of the following?  New or Increased Pain, New or Increased Fatigue, Loneliness, Social Isolation, Stress or Anger?: None of These  Do you get the social and emotional support that you need?: Yes  Do you have a Living Will?: Yes  Advance Directives     Power of  Living Will ACP-Advance Directive ACP-Power of     Not on File Not on File Not on File Not on File General Health Risk Interventions:  · no concerns    Health Habits/Nutrition:  Health Habits/Nutrition  Do you exercise for at least 20 minutes 2-3 times per week?: Yes  Have you lost any weight without trying in the past 3 months?: No  Do you eat only one meal per day?: No  Have you seen the dentist within the past year?: Yes  Body mass index: (!) 32.61  Health Habits/Nutrition Interventions:  · Inadequate physical activity:  patient is not ready to increase his/her physical activity level at this time  · Nutritional issues:  patient is not ready to address his/her nutritional/weight issues at this time     Safety:  Safety  Do you have working smoke detectors?: Yes  Have all throw rugs been removed or fastened?: (!) No  Do you have non-slip mats or surfaces in all bathtubs/showers?: (!) No  Do all of your stairways have a railing or banister?: Yes  Are your doorways, halls and stairs free of clutter?: Yes  Do you always fasten your seatbelt when you are in a car?: Yes  Safety Interventions:  · Home safety tips provided    ADL:  ADLs  In the past 7 days, did you need help from others to perform any of the following everyday activities? Eating, dressing, grooming, bathing, toileting, or walking/balance?: None  In the past 7 days, did you need help from others to take care of any of the following?  Laundry, housekeeping, banking/finances, shopping, telephone use, food preparation, transportation, or taking medications?: (!) Housekeeping,Shopping,Transportation  ADL Interventions:  · Patient declines any further evaluation/treatment for this issue    Personalized Preventive Plan   Current Health Maintenance Status  Immunization History   Administered Date(s) Administered    Influenza 11/03/2011, 09/28/2012, 10/11/2013    Influenza Vaccine, unspecified formulation 10/14/2016    Influenza Virus Vaccine 11/01/2002, 10/15/2003, 10/19/2005, 11/01/2006, 10/26/2007, 10/11/2013, 10/10/2014, 10/08/2015, 11/05/2017   

## 2021-12-08 DIAGNOSIS — E03.9 HYPOTHYROIDISM, UNSPECIFIED TYPE: Primary | ICD-10-CM

## 2021-12-08 DIAGNOSIS — K59.00 CONSTIPATION, UNSPECIFIED CONSTIPATION TYPE: ICD-10-CM

## 2021-12-08 DIAGNOSIS — D50.9 IRON DEFICIENCY ANEMIA, UNSPECIFIED IRON DEFICIENCY ANEMIA TYPE: ICD-10-CM

## 2021-12-08 RX ORDER — FERROUS SULFATE 325(65) MG
325 TABLET ORAL 2 TIMES DAILY WITH MEALS
Qty: 180 TABLET | Refills: 1 | Status: SHIPPED | OUTPATIENT
Start: 2021-12-08 | End: 2022-04-12 | Stop reason: SDUPTHER

## 2021-12-08 RX ORDER — DOCUSATE SODIUM 100 MG/1
100 CAPSULE, LIQUID FILLED ORAL DAILY
Qty: 90 CAPSULE | Refills: 1 | Status: SHIPPED | OUTPATIENT
Start: 2021-12-08 | End: 2022-04-12 | Stop reason: SDUPTHER

## 2021-12-08 RX ORDER — LEVOTHYROXINE SODIUM 0.1 MG/1
100 TABLET ORAL DAILY
Qty: 30 TABLET | Refills: 0 | Status: SHIPPED | OUTPATIENT
Start: 2021-12-08 | End: 2022-07-05 | Stop reason: SDUPTHER

## 2022-02-08 DIAGNOSIS — G30.1 LATE ONSET ALZHEIMER'S DISEASE WITHOUT BEHAVIORAL DISTURBANCE (HCC): ICD-10-CM

## 2022-02-08 DIAGNOSIS — F02.80 LATE ONSET ALZHEIMER'S DISEASE WITHOUT BEHAVIORAL DISTURBANCE (HCC): ICD-10-CM

## 2022-02-08 DIAGNOSIS — N40.0 BENIGN NON-NODULAR PROSTATIC HYPERPLASIA WITHOUT LOWER URINARY TRACT SYMPTOMS: ICD-10-CM

## 2022-02-08 NOTE — TELEPHONE ENCOUNTER
Last visit: 12/07/21  Last Med refill: 07/23/21  Does patient have enough medication for 72 hours: Yes. Next Visit Date:  No future appointments.     Health Maintenance   Topic Date Due    Lipid screen  12/07/2022 (Originally 10/2/2020)    DTaP/Tdap/Td vaccine (1 - Tdap) 12/07/2022 (Originally 3/5/1945)    Shingles Vaccine (1 of 2) 12/07/2022 (Originally 3/5/1976)    COVID-19 Vaccine (1) 12/07/2022 (Originally 3/5/1931)    Depression Screen  12/07/2022    TSH testing  12/07/2022    Potassium monitoring  12/07/2022    Creatinine monitoring  12/07/2022    Annual Wellness Visit (AWV)  12/08/2022    Flu vaccine  Completed    Pneumococcal 65+ years Vaccine  Completed    Hepatitis A vaccine  Aged Out    Hepatitis B vaccine  Aged Out    Hib vaccine  Aged Out    Meningococcal (ACWY) vaccine  Aged Out       Hemoglobin A1C (%)   Date Value   10/15/2015 5.1   05/21/2013 5.6             ( goal A1C is < 7)   No results found for: LABMICR  LDL Cholesterol (mg/dL)   Date Value   10/02/2019 73   05/03/2019 57       (goal LDL is <100)   AST (U/L)   Date Value   10/16/2020 13     ALT (U/L)   Date Value   10/16/2020 8     BUN (mg/dL)   Date Value   12/07/2021 15     BP Readings from Last 3 Encounters:   12/07/21 132/74   08/11/20 110/77   03/03/20 124/84          (goal 120/80)    All Future Testing planned in [facility]  Lab Frequency Next Occurrence               Patient Active Problem List:     BPH (benign prostatic hyperplasia)     Hypertension     Hyperlipidemia     Hypothyroidism     CAD (coronary artery disease)     GERD (gastroesophageal reflux disease)     Alzheimer's dementia ([facility] [address]     History of DVT of lower extremity     Chronic kidney disease, stage 3 (HCC)     Thoracic aortic atherosclerosis ([address]     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

## 2022-02-08 NOTE — TELEPHONE ENCOUNTER
Last visit: 12/07/21  Last Med refill: Avodart 08/23/21, Rapaflo 08/23/21  Does patient have enough medication for 72 hours: Yes. Next Visit Date:  No future appointments.     Health Maintenance   Topic Date Due    Lipid screen  12/07/2022 (Originally 10/2/2020)    DTaP/Tdap/Td vaccine (1 - Tdap) 12/07/2022 (Originally 3/5/1945)    Shingles Vaccine (1 of 2) 12/07/2022 (Originally 3/5/1976)    COVID-19 Vaccine (1) 12/07/2022 (Originally 3/5/1931)    Depression Screen  12/07/2022    TSH testing  12/07/2022    Potassium monitoring  12/07/2022    Creatinine monitoring  12/07/2022    Annual Wellness Visit (AWV)  12/08/2022    Flu vaccine  Completed    Pneumococcal 65+ years Vaccine  Completed    Hepatitis A vaccine  Aged Out    Hepatitis B vaccine  Aged Out    Hib vaccine  Aged Out    Meningococcal (ACWY) vaccine  Aged Out       Hemoglobin A1C (%)   Date Value   10/15/2015 5.1   05/21/2013 5.6             ( goal A1C is < 7)   No results found for: LABMICR  LDL Cholesterol (mg/dL)   Date Value   10/02/2019 73   05/03/2019 57       (goal LDL is <100)   AST (U/L)   Date Value   10/16/2020 13     ALT (U/L)   Date Value   10/16/2020 8     BUN (mg/dL)   Date Value   12/07/2021 15     BP Readings from Last 3 Encounters:   12/07/21 132/74   08/11/20 110/77   03/03/20 124/84          (goal 120/80)    All Future Testing planned in [facility]  Lab Frequency Next Occurrence               Patient Active Problem List:     BPH (benign prostatic hyperplasia)     Hypertension     Hyperlipidemia     Hypothyroidism     CAD (coronary artery disease)     GERD (gastroesophageal reflux disease)     Alzheimer's dementia ([facility] [address]     History of DVT of lower extremity     Chronic kidney disease, stage 3 (HCC)     Thoracic aortic atherosclerosis ([facility] [address]     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

## 2022-02-10 RX ORDER — DUTASTERIDE 0.5 MG/1
CAPSULE, LIQUID FILLED ORAL
Qty: 90 CAPSULE | Refills: 1 | Status: SHIPPED | OUTPATIENT
Start: 2022-02-10 | End: 2022-08-11

## 2022-02-10 RX ORDER — SILODOSIN 8 MG/1
CAPSULE ORAL
Qty: 90 CAPSULE | Refills: 1 | Status: SHIPPED | OUTPATIENT
Start: 2022-02-10 | End: 2022-08-11

## 2022-02-10 RX ORDER — MEMANTINE HYDROCHLORIDE 5 MG/1
TABLET ORAL
Qty: 180 TABLET | Refills: 1 | Status: SHIPPED | OUTPATIENT
Start: 2022-02-10 | End: 2022-07-19

## 2022-02-11 DIAGNOSIS — Z86.718 HISTORY OF DEEP VEIN THROMBOSIS (DVT) OF LOWER EXTREMITY: ICD-10-CM

## 2022-02-11 NOTE — TELEPHONE ENCOUNTER
LOV 12/7/21  RTO 1 year  LRF 10/26/21    Health Maintenance   Topic Date Due    Lipid screen  12/07/2022 (Originally 10/2/2020)    DTaP/Tdap/Td vaccine (1 - Tdap) 12/07/2022 (Originally 3/5/1945)    Shingles Vaccine (1 of 2) 12/07/2022 (Originally 3/5/1976)    COVID-19 Vaccine (1) 12/07/2022 (Originally 3/5/1931)    Depression Screen  12/07/2022    TSH testing  12/07/2022    Potassium monitoring  12/07/2022    Creatinine monitoring  12/07/2022    Annual Wellness Visit (AWV)  12/08/2022    Flu vaccine  Completed    Pneumococcal 65+ years Vaccine  Completed    Hepatitis A vaccine  Aged Out    Hepatitis B vaccine  Aged Out    Hib vaccine  Aged Out    Meningococcal (ACWY) vaccine  Aged Out             (applicable per patient's age: Cancer Screenings, Depression Screening, Fall Risk Screening, Immunizations)    Hemoglobin A1C (%)   Date Value   10/15/2015 5.1   05/21/2013 5.6     LDL Cholesterol (mg/dL)   Date Value   10/02/2019 73     AST (U/L)   Date Value   10/16/2020 13     ALT (U/L)   Date Value   10/16/2020 8     BUN (mg/dL)   Date Value   12/07/2021 15      (goal A1C is < 7)   (goal LDL is <100) need 30-50% reduction from baseline     BP Readings from Last 3 Encounters:   12/07/21 132/74   08/11/20 110/77   03/03/20 124/84    (goal /80)      All Future Testing planned in [facility]:  Lab Frequency Next Occurrence       Next Visit Date:  No future appointments.          Patient Active Problem List:     BPH (benign prostatic hyperplasia)     Hypertension     Hyperlipidemia     Hypothyroidism     CAD (coronary artery disease)     GERD (gastroesophageal reflux disease)     Alzheimer's dementia ([address]     History of DVT of lower extremity     Chronic kidney disease, stage 3 ([address]     Thoracic aortic atherosclerosis ([address]     Exudative age-related macular degeneration of both eyes with active choroidal neovascularization ([address]     Dry eyes

## 2022-02-15 ENCOUNTER — TELEPHONE (OUTPATIENT)
Dept: FAMILY MEDICINE CLINIC | Age: 87
End: 2022-02-15

## 2022-02-15 NOTE — TELEPHONE ENCOUNTER
Called [facility] for A PA they recommended switch to formulary of [address] approved this. It will be changed.

## 2022-02-25 ENCOUNTER — TELEPHONE (OUTPATIENT)
Dept: FAMILY MEDICINE CLINIC | Age: 87
End: 2022-02-25

## 2022-03-01 NOTE — TELEPHONE ENCOUNTER
Wallace Davis: did you ever get a hold of the pharmacy regarding this?  I dont want to change him over if I dont have to

## 2022-03-02 DIAGNOSIS — Z86.718 HISTORY OF DEEP VEIN THROMBOSIS (DVT) OF LOWER EXTREMITY: Primary | ICD-10-CM

## 2022-03-02 RX ORDER — RIVAROXABAN 2.5 MG/1
2.5 TABLET, FILM COATED ORAL 2 TIMES DAILY
Qty: 180 TABLET | Refills: 1 | Status: SHIPPED | OUTPATIENT
Start: 2022-03-02 | End: 2023-03-02

## 2022-03-02 NOTE — TELEPHONE ENCOUNTER
The [employer] and they are no longer covering Eliquis, they are now covering Xarelto. There is a letter of PA denial in media stating the change.     Please advise

## 2022-03-10 DIAGNOSIS — K21.9 GASTROESOPHAGEAL REFLUX DISEASE WITHOUT ESOPHAGITIS: ICD-10-CM

## 2022-03-10 RX ORDER — PANTOPRAZOLE SODIUM 40 MG/1
TABLET, DELAYED RELEASE ORAL
Qty: 90 TABLET | Refills: 1 | Status: SHIPPED | OUTPATIENT
Start: 2022-03-10 | End: 2022-09-13

## 2022-03-10 NOTE — TELEPHONE ENCOUNTER
Last visit: 12/07/21  Last Med refill: 08/23/21  Does patient have enough medication for 72 hours: Yes    Next Visit Date:  No future appointments.     Health Maintenance   Topic Date Due    Lipid screen  12/07/2022 (Originally 10/2/2020)    DTaP/Tdap/Td vaccine (1 - Tdap) 12/07/2022 (Originally 3/5/1945)    Shingles Vaccine (1 of 2) 12/07/2022 (Originally 3/5/1976)    COVID-19 Vaccine (1) 12/07/2022 (Originally 3/5/1931)    Depression Screen  12/07/2022    TSH testing  12/07/2022    Potassium monitoring  12/07/2022    Creatinine monitoring  12/07/2022    Annual Wellness Visit (AWV)  12/08/2022    Flu vaccine  Completed    Pneumococcal 65+ years Vaccine  Completed    Hepatitis A vaccine  Aged Out    Hepatitis B vaccine  Aged Out    Hib vaccine  Aged Out    Meningococcal (ACWY) vaccine  Aged Out       Hemoglobin A1C (%)   Date Value   10/15/2015 5.1   05/21/2013 5.6             ( goal A1C is < 7)   No results found for: LABMICR  LDL Cholesterol (mg/dL)   Date Value   10/02/2019 73   05/03/2019 57       (goal LDL is <100)   AST (U/L)   Date Value   10/16/2020 13     ALT (U/L)   Date Value   10/16/2020 8     BUN (mg/dL)   Date Value   12/07/2021 15     BP Readings from Last 3 Encounters:   12/07/21 132/74   08/11/20 110/77   03/03/20 124/84          (goal 120/80)    All Future Testing planned in [facility]  Lab Frequency Next Occurrence               Patient Active Problem List:     BPH (benign prostatic hyperplasia)     Hypertension     Hyperlipidemia     Hypothyroidism     CAD (coronary artery disease)     GERD (gastroesophageal reflux disease)     Alzheimer's dementia ([facility] [address]     History of DVT of lower extremity     Chronic kidney disease, stage 3 (HCC)     Thoracic aortic atherosclerosis ([address]     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

## 2022-03-26 DIAGNOSIS — G30.1 LATE ONSET ALZHEIMER'S DISEASE WITHOUT BEHAVIORAL DISTURBANCE (HCC): ICD-10-CM

## 2022-03-26 DIAGNOSIS — F02.80 LATE ONSET ALZHEIMER'S DISEASE WITHOUT BEHAVIORAL DISTURBANCE (HCC): ICD-10-CM

## 2022-03-29 RX ORDER — DONEPEZIL HYDROCHLORIDE 10 MG/1
TABLET, FILM COATED ORAL
Qty: 90 TABLET | Refills: 1 | Status: SHIPPED | OUTPATIENT
Start: 2022-03-29 | End: 2022-09-27 | Stop reason: SDUPTHER

## 2022-03-29 NOTE — TELEPHONE ENCOUNTER
Last visit: 12/7/21  Last Med refill: 9/21/21  Does patient have enough medication for 72 hours: Yes    Next Visit Date:  No future appointments.     Health Maintenance   Topic Date Due    Lipid screen  12/07/2022 (Originally 10/2/2020)    DTaP/Tdap/Td vaccine (1 - Tdap) 12/07/2022 (Originally 3/5/1945)    Shingles Vaccine (1 of 2) 12/07/2022 (Originally 3/5/1976)    COVID-19 Vaccine (1) 12/07/2022 (Originally 3/5/1931)    Depression Screen  12/07/2022    TSH testing  12/07/2022    Potassium monitoring  12/07/2022    Creatinine monitoring  12/07/2022    Annual Wellness Visit (AWV)  12/08/2022    Flu vaccine  Completed    Pneumococcal 65+ years Vaccine  Completed    Hepatitis A vaccine  Aged Out    Hepatitis B vaccine  Aged Out    Hib vaccine  Aged Out    Meningococcal (ACWY) vaccine  Aged Out       Hemoglobin A1C (%)   Date Value   10/15/2015 5.1   05/21/2013 5.6             ( goal A1C is < 7)   No results found for: LABMICR  LDL Cholesterol (mg/dL)   Date Value   10/02/2019 73   05/03/2019 57       (goal LDL is <100)   AST (U/L)   Date Value   10/16/2020 13     ALT (U/L)   Date Value   10/16/2020 8     BUN (mg/dL)   Date Value   12/07/2021 15     BP Readings from Last 3 Encounters:   12/07/21 132/74   08/11/20 110/77   03/03/20 124/84          (goal 120/80)    All Future Testing planned in [facility]  Lab Frequency Next Occurrence               Patient Active Problem List:     BPH (benign prostatic hyperplasia)     Hypertension     Hyperlipidemia     Hypothyroidism     CAD (coronary artery disease)     GERD (gastroesophageal reflux disease)     Alzheimer's dementia ([facility] [address]     History of DVT of lower extremity     Chronic kidney disease, stage 3 (HCC)     Thoracic aortic atherosclerosis ([address]     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

## 2022-04-03 ENCOUNTER — HOSPITAL ENCOUNTER (EMERGENCY)
Facility: CLINIC | Age: 87
Discharge: HOME | End: 2022-04-03
Attending: EMERGENCY MEDICINE
Payer: MEDICARE

## 2022-04-03 VITALS
SYSTOLIC BLOOD PRESSURE: 160 MMHG | HEIGHT: 65 IN | WEIGHT: 193 LBS | TEMPERATURE: 97.9 F | DIASTOLIC BLOOD PRESSURE: 89 MMHG | OXYGEN SATURATION: 98 % | RESPIRATION RATE: 16 BRPM | HEART RATE: 77 BPM | BODY MASS INDEX: 32.15 KG/M2

## 2022-04-03 DIAGNOSIS — B02.9 HERPES ZOSTER WITHOUT COMPLICATION: Primary | ICD-10-CM

## 2022-04-03 PROCEDURE — 6360000002 HC RX W HCPCS: Performed by: EMERGENCY MEDICINE

## 2022-04-03 PROCEDURE — 96372 THER/PROPH/DIAG INJ SC/IM: CPT

## 2022-04-03 PROCEDURE — 99284 EMERGENCY DEPT VISIT MOD MDM: CPT

## 2022-04-03 RX ORDER — TRAMADOL HYDROCHLORIDE 50 MG/1
50 TABLET ORAL EVERY 6 HOURS PRN
Qty: 20 TABLET | Refills: 0 | Status: SHIPPED | OUTPATIENT
Start: 2022-04-03 | End: 2022-04-06

## 2022-04-03 RX ORDER — KETOROLAC TROMETHAMINE 30 MG/ML
30 INJECTION, SOLUTION INTRAMUSCULAR; INTRAVENOUS ONCE
Status: COMPLETED | OUTPATIENT
Start: 2022-04-03 | End: 2022-04-03

## 2022-04-03 RX ORDER — ACYCLOVIR 800 MG/1
800 TABLET ORAL
Qty: 50 TABLET | Refills: 0 | Status: SHIPPED | OUTPATIENT
Start: 2022-04-03 | End: 2022-04-13

## 2022-04-03 RX ADMIN — KETOROLAC TROMETHAMINE 30 MG: 30 INJECTION, SOLUTION INTRAMUSCULAR; INTRAVENOUS at 10:53

## 2022-04-03 ASSESSMENT — ENCOUNTER SYMPTOMS
BACK PAIN: 0
DIARRHEA: 0
WHEEZING: 0
SORE THROAT: 0
TROUBLE SWALLOWING: 0
VOMITING: 0
ABDOMINAL PAIN: 0
NAUSEA: 0
SHORTNESS OF BREATH: 0

## 2022-04-03 ASSESSMENT — PAIN DESCRIPTION - PROGRESSION: CLINICAL_PROGRESSION: NOT CHANGED

## 2022-04-03 ASSESSMENT — PAIN DESCRIPTION - DESCRIPTORS: DESCRIPTORS: ACHING

## 2022-04-03 ASSESSMENT — PAIN SCALES - GENERAL
PAINLEVEL_OUTOF10: 10
PAINLEVEL_OUTOF10: 10

## 2022-04-03 ASSESSMENT — PAIN DESCRIPTION - PAIN TYPE: TYPE: ACUTE PAIN

## 2022-04-03 ASSESSMENT — PAIN DESCRIPTION - FREQUENCY: FREQUENCY: CONTINUOUS

## 2022-04-03 ASSESSMENT — PAIN DESCRIPTION - ONSET: ONSET: ON-GOING

## 2022-04-03 ASSESSMENT — PAIN DESCRIPTION - LOCATION: LOCATION: FOOT

## 2022-04-03 ASSESSMENT — PAIN - FUNCTIONAL ASSESSMENT: PAIN_FUNCTIONAL_ASSESSMENT: 0-10

## 2022-04-03 NOTE — ED NOTES
Patient to ED via son to room 6  Patient here for complaint of foot pain with rash  Son states that patient has a hx of gout and believes it is flaring up  Patient has intense pain even with slightest of touch to his foot with small red patches on skin  Patient is able to ambulate slowly  Denies pain to leg or calf  Denies CP, SOB, or N/V    Vitals obtained and call light provided  Patient resting comfortably on stretcher in no apparent distress  Respirations even and non-labored  Dr. Ean Mathias at bedside to evaluate patient     Rubbie Closs, RN  04/03/22 [id number]

## 2022-04-12 DIAGNOSIS — K59.00 CONSTIPATION, UNSPECIFIED CONSTIPATION TYPE: ICD-10-CM

## 2022-04-12 DIAGNOSIS — D50.9 IRON DEFICIENCY ANEMIA, UNSPECIFIED IRON DEFICIENCY ANEMIA TYPE: ICD-10-CM

## 2022-04-12 RX ORDER — DOCUSATE SODIUM 100 MG/1
100 CAPSULE, LIQUID FILLED ORAL DAILY
Qty: 90 CAPSULE | Refills: 1 | Status: SHIPPED | OUTPATIENT
Start: 2022-04-12 | End: 2023-04-12

## 2022-04-12 RX ORDER — FERROUS SULFATE 325(65) MG
325 TABLET ORAL 2 TIMES DAILY WITH MEALS
Qty: 180 TABLET | Refills: 1 | Status: SHIPPED | OUTPATIENT
Start: 2022-04-12 | End: 2023-04-12

## 2022-05-17 ENCOUNTER — TELEPHONE (OUTPATIENT)
Dept: FAMILY MEDICINE CLINIC | Age: 87
End: 2022-05-17

## 2022-05-17 DIAGNOSIS — K59.00 CONSTIPATION, UNSPECIFIED CONSTIPATION TYPE: Primary | ICD-10-CM

## 2022-05-17 NOTE — TELEPHONE ENCOUNTER
Patient's daughter in law called stating patient is constipated. He currently is on two stool softening meds. Is there anything else he can take to relieve the sx's? He has also tried two OTC saline enemas, with no success. Sx's have been for more about 3 days.   Rx: R/A Blaise Valladares

## 2022-05-19 ENCOUNTER — HOSPITAL ENCOUNTER (OUTPATIENT)
Dept: GENERAL RADIOLOGY | Facility: CLINIC | Age: 87
Discharge: HOME | End: 2022-05-21
Payer: MEDICARE

## 2022-05-19 ENCOUNTER — HOSPITAL ENCOUNTER (OUTPATIENT)
Facility: CLINIC | Age: 87
Discharge: HOME | End: 2022-05-21
Payer: MEDICARE

## 2022-05-19 DIAGNOSIS — K59.00 CONSTIPATION, UNSPECIFIED CONSTIPATION TYPE: ICD-10-CM

## 2022-05-19 PROCEDURE — 74022 RADEX COMPL AQT ABD SERIES: CPT

## 2022-05-19 RX ORDER — POLYETHYLENE GLYCOL 3350 17 G/17G
17 POWDER, FOR SOLUTION ORAL DAILY
Qty: 1530 G | Refills: 1 | Status: SHIPPED | OUTPATIENT
Start: 2022-05-19 | End: 2022-06-18

## 2022-05-19 NOTE — TELEPHONE ENCOUNTER
Called and spoke to STAFFANSTORP, daughter. Reports he still has not had a BM. Recommended magnesium citrate as well as miralax. obtain ABB xray as well.

## 2022-07-05 DIAGNOSIS — E03.9 HYPOTHYROIDISM, UNSPECIFIED TYPE: ICD-10-CM

## 2022-07-05 RX ORDER — LEVOTHYROXINE SODIUM 0.1 MG/1
100 TABLET ORAL DAILY
Qty: 90 TABLET | Refills: 1 | Status: SHIPPED | OUTPATIENT
Start: 2022-07-05 | End: 2023-07-05

## 2022-07-05 NOTE — TELEPHONE ENCOUNTER
LOV 12/07/2021  LRF 12/08/2022  RTO none  Health Maintenance   Topic Date Due    Lipids  12/07/2022 (Originally 10/2/2020)    DTaP/Tdap/Td vaccine (1 - Tdap) 12/07/2022 (Originally 3/5/1945)    Shingles vaccine (1 of 2) 12/07/2022 (Originally 3/5/1976)    COVID-19 Vaccine (2 - Booster for Karrie series) 12/07/2022 (Originally 6/24/2021)    Flu vaccine (1) 09/01/2022    Depression Screen  12/07/2022    Annual Wellness Visit (AWV)  12/08/2022    Pneumococcal 65+ years Vaccine  Completed    Hepatitis A vaccine  Aged Out    Hepatitis B vaccine  Aged Out    Hib vaccine  Aged Out    Meningococcal (ACWY) vaccine  Aged Out             (applicable per patient's age: Cancer Screenings, Depression Screening, Fall Risk Screening, Immunizations)    Hemoglobin A1C (%)   Date Value   10/15/2015 5.1   05/21/2013 5.6     LDL Cholesterol (mg/dL)   Date Value   10/02/2019 73     AST (U/L)   Date Value   10/16/2020 13     ALT (U/L)   Date Value   10/16/2020 8     BUN (mg/dL)   Date Value   12/07/2021 15      (goal A1C is < 7)   (goal LDL is <100) need 30-50% reduction from baseline     BP Readings from Last 3 Encounters:   04/03/22 (!) 160/89   12/07/21 132/74   08/11/20 110/77    (goal /80)      All Future Testing planned in [facility]:  Lab Frequency Next Occurrence       Next Visit Date:  No future appointments. Patient Active Problem List:     BPH (benign prostatic hyperplasia)     Hypertension     Hyperlipidemia     Hypothyroidism     CAD (coronary artery disease)     GERD (gastroesophageal reflux disease)     Alzheimer's dementia ([address]     History of DVT of lower extremity     Chronic kidney disease, stage 3 ([address]     Thoracic aortic atherosclerosis ([address]     Exudative age-related macular degeneration of both eyes with active choroidal neovascularization ([address]     Dry eyes    .

## 2022-07-16 DIAGNOSIS — E78.2 MIXED HYPERLIPIDEMIA: ICD-10-CM

## 2022-07-16 DIAGNOSIS — F02.80 LATE ONSET ALZHEIMER'S DISEASE WITHOUT BEHAVIORAL DISTURBANCE (HCC): ICD-10-CM

## 2022-07-16 DIAGNOSIS — G30.1 LATE ONSET ALZHEIMER'S DISEASE WITHOUT BEHAVIORAL DISTURBANCE (HCC): ICD-10-CM

## 2022-07-18 NOTE — TELEPHONE ENCOUNTER
LOV: 12/7/21  LRF: 1/5/22, 2/10/22  NA: 0  Health Maintenance   Topic Date Due    Lipids  12/07/2022 (Originally 10/2/2020)    DTaP/Tdap/Td vaccine (1 - Tdap) 12/07/2022 (Originally 3/5/1945)    Shingles vaccine (1 of 2) 12/07/2022 (Originally 3/5/1976)    COVID-19 Vaccine (2 - Booster for Karrie series) 12/07/2022 (Originally 6/24/2021)    Flu vaccine (1) 09/01/2022    Depression Screen  12/07/2022    Annual Wellness Visit (AWV)  12/08/2022    Pneumococcal 65+ years Vaccine  Completed    Hepatitis A vaccine  Aged Out    Hepatitis B vaccine  Aged Out    Hib vaccine  Aged Out    Meningococcal (ACWY) vaccine  Aged Out             (applicable per patient's age: Cancer Screenings, Depression Screening, Fall Risk Screening, Immunizations)    Hemoglobin A1C (%)   Date Value   10/15/2015 5.1   05/21/2013 5.6     LDL Cholesterol (mg/dL)   Date Value   10/02/2019 73     AST (U/L)   Date Value   10/16/2020 13     ALT (U/L)   Date Value   10/16/2020 8     BUN (mg/dL)   Date Value   12/07/2021 15      (goal A1C is < 7)   (goal LDL is <100) need 30-50% reduction from baseline     BP Readings from Last 3 Encounters:   04/03/22 (!) 160/89   12/07/21 132/74   08/11/20 110/77    (goal /80)      All Future Testing planned in [facility]:  Lab Frequency Next Occurrence       Next Visit Date:  No future appointments.          Patient Active Problem List:     BPH (benign prostatic hyperplasia)     Hypertension     Hyperlipidemia     Hypothyroidism     CAD (coronary artery disease)     GERD (gastroesophageal reflux disease)     Alzheimer's dementia ([address]     History of DVT of lower extremity     Chronic kidney disease, stage 3 ([address]     Thoracic aortic atherosclerosis ([address]     Exudative age-related macular degeneration of both eyes with active choroidal neovascularization ([address]     Dry eyes

## 2022-07-19 ENCOUNTER — TELEPHONE (OUTPATIENT)
Dept: FAMILY MEDICINE CLINIC | Age: 87
End: 2022-07-19

## 2022-07-19 RX ORDER — MEMANTINE HYDROCHLORIDE 5 MG/1
TABLET ORAL
Qty: 180 TABLET | Refills: 1 | Status: SHIPPED | OUTPATIENT
Start: 2022-07-19

## 2022-07-19 RX ORDER — LOVASTATIN 40 MG/1
TABLET ORAL
Qty: 90 TABLET | Refills: 1 | Status: SHIPPED | OUTPATIENT
Start: 2022-07-19

## 2022-07-19 NOTE — TELEPHONE ENCOUNTER
----- Message from Skyler Salas sent at 7/14/2022  2:37 PM EDT -----  Subject: Appointment Request    Reason for Call: Established Patient Appointment needed: Routine Existing   Condition Follow Up    QUESTIONS    Reason for appointment request? Available appointments did not meet   patient need     Additional Information for Provider? Patient's son, Catracho Alas, called   in in regards to paperwork being filled out for patient to go into   assisted living. Please contact Lawrencejose Hamilton back at phone number below.  Thanks!  ---------------------------------------------------------------------------  --------------  Morgan MICHELLE  [phone]; OK to leave message on voicemail  ---------------------------------------------------------------------------  --------------  SCRIPT ANSWERS  COVID Screen: Katherine Current <GE ALONZO - Last Filed: 12/18/17 17:15>





ED General Adult HPI





- General


Chief complaint: Dyspnea/Respdistress


Stated complaint: SHORTNESS OF BREATH


Time Seen by Provider: 12/18/17 12:39





- Related Data


 Previous Rx's











 Medication  Instructions  Recorded  Last Taken  Type


 


Acetaminophen/Codeine [Tylenol 1 tab PO Q12H PRN #6 tab 06/26/17 Unknown Rx





/Codeine # 3 tab]    


 


Naproxen [Naprosyn] 500 mg PO BID #10 tablet 12/18/17 Unknown Rx











 Allergies











Allergy/AdvReac Type Severity Reaction Status Date / Time


 


No Known Allergies Allergy   Verified 07/28/16 18:17














ED Review of Systems


ROS: 


Stated complaint: SHORTNESS OF BREATH


Other details as noted in HPI








ED Past Medical Hx





- Medications


Home Medications: 


 Home Medications











 Medication  Instructions  Recorded  Confirmed  Last Taken  Type


 


Acetaminophen/Codeine [Tylenol 1 tab PO Q12H PRN #6 tab 06/26/17  Unknown Rx





/Codeine # 3 tab]     


 


Naproxen [Naprosyn] 500 mg PO BID #10 tablet 12/18/17  Unknown Rx














ED Course


 Vital Signs











  12/18/17 12/18/17 12/18/17





  12:16 12:46 12:52


 


Temperature 97.8 F  98.1 F


 


Pulse Rate 68  62


 


Respiratory 20  18





Rate   


 


Blood Pressure 166/91  


 


Blood Pressure   181/98





[Right]   


 


O2 Sat by Pulse 100 100 100





Oximetry   














  12/18/17 12/18/17 12/18/17





  13:00 13:16 13:30


 


Temperature   


 


Pulse Rate 60 57 L 77


 


Respiratory 15 12 11 L





Rate   


 


Blood Pressure 153/68 153/68 153/68


 


Blood Pressure   





[Right]   


 


O2 Sat by Pulse 100  100





Oximetry   














  12/18/17 12/18/17 12/18/17





  13:33 13:46 14:00


 


Temperature   


 


Pulse Rate 98 H 78 87


 


Respiratory 18 14 14





Rate   


 


Blood Pressure 188/82 153/68 153/68


 


Blood Pressure   





[Right]   


 


O2 Sat by Pulse  100 100





Oximetry   














  12/18/17 12/18/17 12/18/17





  14:42 15:03 17:14


 


Temperature   98.5 F


 


Pulse Rate  86 120 H


 


Respiratory   18





Rate   


 


Blood Pressure 153/68 124/97 


 


Blood Pressure   117/51





[Right]   


 


O2 Sat by Pulse 100  100





Oximetry   














ED Medical Decision Making





- Lab Data


Result diagrams: 


 12/18/17 12:30





 12/18/17 12:30





- Medical Decision Making





ABDOMINLA US: NEGATIVE FOR OVARIAN TORSION IN LEFT OVARY, RIGHT OVARY NOT 

EXAMINED BECAUSE PT TERMINATED THE STUDY EARLY. PT WAS NOT TENDER OVER RIGHT 

OVARY ON EXAM


Critical care attestation.: 


If time is entered above; I have spent that time in minutes in the direct care 

of this critically ill patient, excluding procedure time.








ED Disposition


Clinical Impression: 


 Dyspnea





Disposition: DC-01 TO HOME OR SELFCARE


Is pt being admited?: No


Does the pt Need Aspirin: No


Condition: Stable


Instructions:  Acute Abdominal Pain (ED), Hypertension (ED)


Additional Instructions: 


Continue current outpatient medications.  Follow up with her primary care 

doctor or cardiologist within the next 5-7 days for your left sided chest wall 

pain and hypertension.  Please note that blood pressure was very elevated while 

in the emergency department.  This needs to be closely followed up and 

monitored by either an outpatient primary care doctor or cardiologist.  Long-

term complications of hypertension includes stroke, heart attack, disability, 

death, paralysis, loss of quality of life.





Do not breast-feed for the next 24 hours.





Follow-up with your gynecologist at [place] as scheduled.





Return to the ER right away with new pain, worsened pain, migration of pain, 

fevers, chills, lethargy, irritability, projectile vomiting, change in mental 

status, confusion, inability to tolerate liquid feeds.


Prescriptions: 


Naproxen [Naprosyn] 500 mg PO BID #10 tablet


Referrals: 


[facility] [Primary Care Provider] - 3-5 Days


[facility] [Provider Group] - 3-5 Days


[facility] [Provider Group] - 3-5 Days


[facility] [Provider Group] - 3-5 Days


[facility] [Provider Group] - 3-5 Days


[facility] [Provider Group] - 3-5 Days


Time of Disposition: 17:17





<JAIMEE FALCON - Last Filed: 12/20/17 01:02>





ED General Adult HPI





- General


Source: patient, RN notes reviewed


Mode of arrival: Ambulatory


Limitations: No Limitations





- History of Present Illness


Initial comments: 





This is a 33-year-old female who is previously unknown to this provider.  

Patient is status post normal spontaneous vaginal delivery on November 18 at 

[facility].  Patient reports a past medical history of schizophrenia and 

hypertension, reports that she is taking Procardia ER 30 mg.  She reports that 

she was diagnosed with hypertension in the last 2 weeks of her pregnancy.





The patient presents to the ER with shortness of breath and left lateral 

thoracic wall chest pain.  The shortness of breath is new.  It has been present 

for a few days.  It does not radiate anywhere.  It does not have exacerbating 

or relieving factors.  Left-sided chest wall pain has been present for year.  

It waxes and wanes.  He does not have exacerbating or relieving factors.  It 

does not radiate anywhere.  Patient denies severe headache, neck pain, vomiting

, irritative urinary symptoms, still having some vaginal discharge and vaginal 

discomforts after delivery.  Also admits to chronic lower abdominal discomfort 

since her delivery.


-: Gradual


Location: chest, abdomen


Severity scale (0 -10): 1


Quality: aching


Consistency: intermittent


Improves with: rest


Worsens with: movement


Associated Symptoms: chest pain, shortness of breath.  denies: confusion





ED Review of Systems


Constitutional: denies: fever


Respiratory: shortness of breath


Cardiovascular: chest pain


Gastrointestinal: abdominal pain


Genitourinary: denies: dysuria


Musculoskeletal: denies: arthralgia


Neurological: denies: weakness


Psychiatric: anxiety





ED Past Medical Hx





- Past Medical History


Previous Medical History?: Yes


Hx Hypertension: No


Hx Diabetes: No


Hx Deep Vein Thrombosis: No


Hx Renal Disease: No


Hx Sickle Cell Disease: No


Hx Seizures: No


Hx Asthma: No


Hx HIV: No


Additional medical history: Vaginal delivery x 3





- Surgical History


Past Surgical History?: No





- Social History


Smoking Status: Former Smoker


Substance Use Type: Prescribed





ED Physical Exam





- General


Limitations: No Limitations


General appearance: alert, in no apparent distress





- Head


Head exam: Present: atraumatic, normocephalic





- Eye


Eye exam: Present: normal appearance, EOMI.  Absent: nystagmus





- ENT


ENT exam: Present: normal exam, normal orophraynx, mucous membranes moist, 

normal external ear exam





- Neck


Neck exam: Present: normal inspection, full ROM.  Absent: tenderness, 

meningismus





- Respiratory


Respiratory exam: Present: normal lung sounds bilaterally, chest wall 

tenderness (reproducible chest wall tenderness, during the breast examination, 

escorted by carole lindsey, paramedic).  Absent: respiratory distress





- Cardiovascular


Cardiovascular Exam: Present: regular rate, normal rhythm, normal heart sounds.

  Absent: systolic murmur, diastolic murmur, rubs, gallop





- GI/Abdominal


GI/Abdominal exam: Present: soft, tenderness, normal bowel sounds, other (his 

left lower quadrant tenderness, there is suprapubic tenderness, there is no 

rebound, guarding or peritoneal signs).  Absent: distended, guarding, rebound, 

rigid, pulsatile mass





- Extremities Exam


Extremities exam: Present: normal inspection, full ROM, normal capillary 

refill.  Absent: pedal edema, joint swelling, calf tenderness





- Back Exam


Back exam: Present: normal inspection, full ROM.  Absent: tenderness, CVA 

tenderness (R), paraspinal tenderness, vertebral tenderness





- Neurological Exam


Neurological exam: Present: alert, oriented X3, CN II-XII intact, normal gait, 

other (Extraocular movements intact.  Tongue midline.  No facial droop.  Facial 

sensation intact to light touch in the V1, V2, V3 distribution bilaterally.  5 

and 5 strength in 4 extremities..  Sensation is intact to light touch in 4 

extremities.).  Absent: motor sensory deficit





- Psychiatric


Psychiatric exam: Present: normal affect, normal mood





- Skin


Skin exam: Present: warm, dry, intact, normal color.  Absent: rash





ED Course





- Reevaluation(s)


Reevaluation #1: 





12/18/17 14:43


Differential diagnosis, including not limited to: Postpartum cardiac myopathy, 

pulmonary embolus, pneumonia, pericarditis, myocarditis, pericardial effusion





Pneumothorax, intra-abdominal infection, retained products of conception





Assessment and plan: 33-year-old female with chest pain, shortness of breath 

and lower abdominal pain after delivery.  She is one-month status post 

delivery.  She does not have lower extremity edema, she does not have large 

proteinuria, she does not have transaminitis, and she only has elevated blood 

pressure.  Therefore she does not meet definition for postpartum preeclampsia.  

She is given hydralazine and fentanyl.





Patient's been having chest pain on and off for a year.  Low risk by DOROTHY score

, low risk by heart score, troponin negative 1, as for the [employer] of 

emergency physicians clinical policy, myocardial infarction may be excluded 

with 1 set of troponins/cardiac enzymes if symptoms were present for greater 

than 8 hours.





Patient is a poor historian, therefore CT scan of the chest has been obtained, 

and CT scan abdomen and pelvis has also been obtained.  Still sightly 

hypertensive, additional hydralazine has been ordered.








Reevaluation #2: 





12/18/17 15:14


CT scan of the chest is negative.  CT scan of the abdomen and pelvis is 

negative.  Pelvic ultrasound is pending.


Reevaluation #3: 





12/18/17 15:34


Patient had an anxiety attack while in ultrasound, and requested medication to 

help her participate in the ultrasound.  Ativan is ordered.  Blood pressure is 

improved.


Reevaluation #4: 





12/18/17 15:59


care will be transferred to Dr Mccann  to follow up on pelvic ultrasound











if no retained products of conception are noted, I would consider the patient 

suitable for discharge


12/18/17 16:07








ED Medical Decision Making





- Lab Data


Result diagrams: 


 12/18/17 12:30





 12/18/17 12:30








 Vital Signs











  12/18/17 12/18/17 12/18/17





  12:16 12:52 13:33


 


Temperature 97.8 F 98.1 F 


 


Pulse Rate 68 62 98 H


 


Respiratory 20 18 18





Rate   


 


Blood Pressure 166/91  188/82


 


Blood Pressure  181/98 





[Right]   


 


O2 Sat by Pulse 100 100 





Oximetry   














 Lab Results











  12/18/17 12/18/17 12/18/17 Range/Units





  12:30 12:30 12:30 


 


WBC   5.0   (4.5-11.0)  K/mm3


 


RBC   3.58 L   (3.65-5.03)  M/mm3


 


Hgb   11.1   (10.1-14.3)  gm/dl


 


Hct   33.4   (30.3-42.9)  %


 


MCV   94   (79-97)  fl


 


MCH   31   (28-32)  pg


 


MCHC   33   (30-34)  %


 


RDW   13.2   (13.2-15.2)  %


 


Plt Count   305   (140-440)  K/mm3


 


Lymph % (Auto)   37.6 H   (13.4-35.0)  %


 


Mono % (Auto)   9.2 H   (0.0-7.3)  %


 


Eos % (Auto)   1.8   (0.0-4.3)  %


 


Baso % (Auto)   0.3   (0.0-1.8)  %


 


Lymph #   1.9   (1.2-5.4)  K/mm3


 


Mono #   0.5   (0.0-0.8)  K/mm3


 


Eos #   0.1   (0.0-0.4)  K/mm3


 


Baso #   0.0   (0.0-0.1)  K/mm3


 


Seg Neutrophils %   51.1   (40.0-70.0)  %


 


Seg Neutrophils #   2.6   (1.8-7.7)  K/mm3


 


PT     (12.2-14.9)  Sec.


 


INR     (0.87-1.13)  


 


APTT     (24.2-36.6)  Sec.


 


Sodium  141    (137-145)  mmol/L


 


Potassium  4.4    (3.6-5.0)  mmol/L


 


Chloride  102.4    ()  mmol/L


 


Carbon Dioxide  24    (22-30)  mmol/L


 


Anion Gap  19    mmol/L


 


BUN  16    (7-17)  mg/dL


 


Creatinine  0.8    (0.7-1.2)  mg/dL


 


Estimated GFR  > 60    ml/min


 


BUN/Creatinine Ratio  20    %


 


Glucose  77    ()  mg/dL


 


Calcium  9.6    (8.4-10.2)  mg/dL


 


Total Bilirubin    < 0.20  (0.1-1.2)  mg/dL


 


Direct Bilirubin    < 0.2  (0-0.2)  mg/dL


 


Indirect Bilirubin    0.0  mg/dL


 


AST    16  (5-40)  units/L


 


ALT    16  (7-56)  units/L


 


Alkaline Phosphatase    82  ()  units/L


 


Troponin T  < 0.010    (0.00-0.029)  ng/mL


 


Total Protein    6.5  (6.3-8.2)  g/dL


 


Albumin    3.9  (3.9-5)  g/dL


 


Albumin/Globulin Ratio    1.5  %


 


Urine Color     (Yellow)  


 


Urine Turbidity     (Clear)  


 


Urine pH     (5.0-7.0)  


 


Ur Specific Gravity     (1.003-1.030)  


 


Urine Protein     (Negative)  mg/dL


 


Urine Glucose (UA)     (Negative)  mg/dL


 


Urine Ketones     (Negative)  mg/dL


 


Urine Blood     (Negative)  


 


Urine Nitrite     (Negative)  


 


Urine Bilirubin     (Negative)  


 


Urine Urobilinogen     (<2.0)  mg/dL


 


Ur Leukocyte Esterase     (Negative)  


 


Urine WBC (Auto)     (0.0-6.0)  /HPF


 


Urine RBC (Auto)     (0.0-6.0)  /HPF


 


U Epithel Cells (Auto)     (0-13.0)  /HPF


 


Urine Bacteria (Auto)     (Negative)  /HPF


 


Urine Mucus     /HPF














  12/18/17 12/18/17 Range/Units





  12:42 13:52 


 


WBC    (4.5-11.0)  K/mm3


 


RBC    (3.65-5.03)  M/mm3


 


Hgb    (10.1-14.3)  gm/dl


 


Hct    (30.3-42.9)  %


 


MCV    (79-97)  fl


 


MCH    (28-32)  pg


 


MCHC    (30-34)  %


 


RDW    (13.2-15.2)  %


 


Plt Count    (140-440)  K/mm3


 


Lymph % (Auto)    (13.4-35.0)  %


 


Mono % (Auto)    (0.0-7.3)  %


 


Eos % (Auto)    (0.0-4.3)  %


 


Baso % (Auto)    (0.0-1.8)  %


 


Lymph #    (1.2-5.4)  K/mm3


 


Mono #    (0.0-0.8)  K/mm3


 


Eos #    (0.0-0.4)  K/mm3


 


Baso #    (0.0-0.1)  K/mm3


 


Seg Neutrophils %    (40.0-70.0)  %


 


Seg Neutrophils #    (1.8-7.7)  K/mm3


 


PT  12.7   (12.2-14.9)  Sec.


 


INR  0.91   (0.87-1.13)  


 


APTT  30.0   (24.2-36.6)  Sec.


 


Sodium    (137-145)  mmol/L


 


Potassium    (3.6-5.0)  mmol/L


 


Chloride    ()  mmol/L


 


Carbon Dioxide    (22-30)  mmol/L


 


Anion Gap    mmol/L


 


BUN    (7-17)  mg/dL


 


Creatinine    (0.7-1.2)  mg/dL


 


Estimated GFR    ml/min


 


BUN/Creatinine Ratio    %


 


Glucose    ()  mg/dL


 


Calcium    (8.4-10.2)  mg/dL


 


Total Bilirubin    (0.1-1.2)  mg/dL


 


Direct Bilirubin    (0-0.2)  mg/dL


 


Indirect Bilirubin    mg/dL


 


AST    (5-40)  units/L


 


ALT    (7-56)  units/L


 


Alkaline Phosphatase    ()  units/L


 


Troponin T    (0.00-0.029)  ng/mL


 


Total Protein    (6.3-8.2)  g/dL


 


Albumin    (3.9-5)  g/dL


 


Albumin/Globulin Ratio    %


 


Urine Color   Yellow  (Yellow)  


 


Urine Turbidity   Clear  (Clear)  


 


Urine pH   5.0  (5.0-7.0)  


 


Ur Specific Gravity   1.011  (1.003-1.030)  


 


Urine Protein   <15 mg/dl  (Negative)  mg/dL


 


Urine Glucose (UA)   Neg  (Negative)  mg/dL


 


Urine Ketones   Neg  (Negative)  mg/dL


 


Urine Blood   Sm  (Negative)  


 


Urine Nitrite   Neg  (Negative)  


 


Urine Bilirubin   Neg  (Negative)  


 


Urine Urobilinogen   < 2.0  (<2.0)  mg/dL


 


Ur Leukocyte Esterase   Neg  (Negative)  


 


Urine WBC (Auto)   2.0  (0.0-6.0)  /HPF


 


Urine RBC (Auto)   < 1.0  (0.0-6.0)  /HPF


 


U Epithel Cells (Auto)   3.0  (0-13.0)  /HPF


 


Urine Bacteria (Auto)   1+  (Negative)  /HPF


 


Urine Mucus   Few  /HPF














- EKG Data





12/18/17 14:42


Sinus, 61 bpm, normal axis, NJ interval slightly prolonged, 222 ms, abnormal EKG

, not morphologically consistent with ST elevation myocardial infarction





- Radiology Data


Radiology results: report reviewed, image reviewed





X-ray the chest is negative.





CT scan of the chest:





CT scan of the abdomen and pelvis:











ED Disposition


Is pt being admited?: No


Does the pt Need Aspirin: No

## 2022-07-22 ENCOUNTER — OFFICE VISIT (OUTPATIENT)
Dept: FAMILY MEDICINE CLINIC | Age: 87
End: 2022-07-22
Payer: MEDICARE

## 2022-07-22 VITALS
TEMPERATURE: 98.1 F | HEIGHT: 65 IN | DIASTOLIC BLOOD PRESSURE: 72 MMHG | HEART RATE: 67 BPM | SYSTOLIC BLOOD PRESSURE: 144 MMHG | BODY MASS INDEX: 27.06 KG/M2 | OXYGEN SATURATION: 98 % | WEIGHT: 162.44 LBS

## 2022-07-22 DIAGNOSIS — N18.30 STAGE 3 CHRONIC KIDNEY DISEASE, UNSPECIFIED WHETHER STAGE 3A OR 3B CKD (HCC): ICD-10-CM

## 2022-07-22 DIAGNOSIS — I70.0 THORACIC AORTIC ATHEROSCLEROSIS (HCC): ICD-10-CM

## 2022-07-22 DIAGNOSIS — F02.80 LATE ONSET ALZHEIMER'S DISEASE WITHOUT BEHAVIORAL DISTURBANCE (HCC): Primary | ICD-10-CM

## 2022-07-22 DIAGNOSIS — G30.1 LATE ONSET ALZHEIMER'S DISEASE WITHOUT BEHAVIORAL DISTURBANCE (HCC): Primary | ICD-10-CM

## 2022-07-22 DIAGNOSIS — H35.3231 EXUDATIVE AGE-RELATED MACULAR DEGENERATION OF BOTH EYES WITH ACTIVE CHOROIDAL NEOVASCULARIZATION (HCC): ICD-10-CM

## 2022-07-22 PROCEDURE — G8417 CALC BMI ABV UP PARAM F/U: HCPCS | Performed by: NURSE PRACTITIONER

## 2022-07-22 PROCEDURE — G8427 DOCREV CUR MEDS BY ELIG CLIN: HCPCS | Performed by: NURSE PRACTITIONER

## 2022-07-22 PROCEDURE — 1123F ACP DISCUSS/DSCN MKR DOCD: CPT | Performed by: NURSE PRACTITIONER

## 2022-07-22 PROCEDURE — 1036F TOBACCO NON-USER: CPT | Performed by: NURSE PRACTITIONER

## 2022-07-22 PROCEDURE — 99213 OFFICE O/P EST LOW 20 MIN: CPT | Performed by: NURSE PRACTITIONER

## 2022-07-22 ASSESSMENT — PATIENT HEALTH QUESTIONNAIRE - PHQ9
SUM OF ALL RESPONSES TO PHQ QUESTIONS 1-9: 0
1. LITTLE INTEREST OR PLEASURE IN DOING THINGS: 0
SUM OF ALL RESPONSES TO PHQ9 QUESTIONS 1 & 2: 0
2. FEELING DOWN, DEPRESSED OR HOPELESS: 0
SUM OF ALL RESPONSES TO PHQ QUESTIONS 1-9: 0

## 2022-07-22 NOTE — PROGRESS NOTES
Ky Chakraborty (:  3/5/1926) is a 80 y.o. male,Established patient, here for evaluation of the following chief complaint(s): Other (F2F for Assisted Living)         ASSESSMENT/PLAN:  1. Late onset Alzheimer's disease without behavioral disturbance ([address]  2. Exudative age-related macular degeneration of both eyes with active choroidal neovascularization ([address]  3. Thoracic aortic atherosclerosis (HCC)  4. Stage 3 chronic kidney disease, unspecified whether stage 3a or 3b CKD ([address]    Return if symptoms worsen or fail to improve. Subjective   SUBJECTIVE/OBJECTIVE:  Presents to office today for F2F. Son is present for visit. Moving into [facility] assisted living. Level 2: memory care. Feel this is what is best for patient at this time as he is requiring more supervision and care. Review of Systems   Constitutional:  Negative for appetite change, chills, fatigue, fever and unexpected weight change. ROS limited due to hearing    HENT:  Negative for congestion, ear pain, postnasal drip, rhinorrhea and sore throat. Bilateral hearing aides   Eyes:  Negative for photophobia, pain and visual disturbance. Wears glasses   Respiratory:  Negative for cough, chest tightness, shortness of breath and wheezing. Cardiovascular:  Negative for chest pain and palpitations. Gastrointestinal:  Negative for abdominal distention, abdominal pain, blood in stool, constipation, diarrhea, nausea and vomiting. Endocrine: Negative for polydipsia and polyuria. Genitourinary:  Negative for dysuria, frequency, hematuria and urgency. Musculoskeletal:  Negative for back pain, gait problem, myalgias and neck stiffness. Skin:  Negative for rash and wound. Allergic/Immunologic: Negative for environmental allergies and food allergies. Neurological:  Negative for dizziness, seizures, syncope, speech difficulty, weakness, numbness and headaches.         Alzheimer's stable   Hematological: Negative for adenopathy. Psychiatric/Behavioral:  Negative for dysphoric mood, self-injury and suicidal ideas. The patient is not nervous/anxious. Misses wife, feels sad sometimes        Objective   Physical Exam  Vitals and nursing note reviewed. Constitutional:       General: He is not in acute distress. Appearance: He is not ill-appearing. HENT:      Head: Normocephalic. Nose: Nose normal.      Mouth/Throat:      Lips: Pink. Pulmonary:      Effort: Pulmonary effort is normal. No respiratory distress. Neurological:      Mental Status: He is alert and oriented to person, place, and time. Psychiatric:         Attention and Perception: Attention normal.         Speech: Speech normal.         Behavior: Behavior is cooperative. An electronic signature was used to authenticate this note.     --YEVGENIY Caruso - MIRZA

## 2022-08-02 ASSESSMENT — ENCOUNTER SYMPTOMS
COUGH: 0
VOMITING: 0
NAUSEA: 0
BLOOD IN STOOL: 0
PHOTOPHOBIA: 0
SHORTNESS OF BREATH: 0
CHEST TIGHTNESS: 0
WHEEZING: 0
BACK PAIN: 0
EYE PAIN: 0
DIARRHEA: 0
SORE THROAT: 0
CONSTIPATION: 0
ABDOMINAL DISTENTION: 0
RHINORRHEA: 0
ABDOMINAL PAIN: 0

## 2022-08-10 DIAGNOSIS — N40.0 BENIGN NON-NODULAR PROSTATIC HYPERPLASIA WITHOUT LOWER URINARY TRACT SYMPTOMS: ICD-10-CM

## 2022-08-11 RX ORDER — SILODOSIN 8 MG/1
CAPSULE ORAL
Qty: 90 CAPSULE | Refills: 1 | Status: SHIPPED | OUTPATIENT
Start: 2022-08-11

## 2022-08-11 RX ORDER — DUTASTERIDE 0.5 MG/1
CAPSULE, LIQUID FILLED ORAL
Qty: 90 CAPSULE | Refills: 1 | Status: SHIPPED | OUTPATIENT
Start: 2022-08-11

## 2022-08-29 LAB
BASOPHILS ABSOLUTE: 0.1 /ΜL
BASOPHILS RELATIVE PERCENT: 0.8 %
BUN BLDV-MCNC: 18 MG/DL
CALCIUM SERPL-MCNC: 9.2 MG/DL
CHLORIDE BLD-SCNC: 106 MMOL/L
CO2: 23 MMOL/L
CREAT SERPL-MCNC: 0.96 MG/DL
EOSINOPHILS ABSOLUTE: 0.2 /ΜL
EOSINOPHILS RELATIVE PERCENT: 2.6 %
GFR CALCULATED: >60
GLUCOSE BLD-MCNC: 97 MG/DL
HCT VFR BLD CALC: 39.5 % (ref 41–53)
HEMOGLOBIN: 13.3 G/DL (ref 13.5–17.5)
LYMPHOCYTES ABSOLUTE: 2 /ΜL
LYMPHOCYTES RELATIVE PERCENT: 23.9 %
MCH RBC QN AUTO: 30.9 PG
MCHC RBC AUTO-ENTMCNC: 33.7 G/DL
MCV RBC AUTO: 91.6 FL
MONOCYTES ABSOLUTE: 0.6 /ΜL
MONOCYTES RELATIVE PERCENT: 7.7 %
NEUTROPHILS ABSOLUTE: 5.3 /ΜL
NEUTROPHILS RELATIVE PERCENT: 63.7 %
PLATELET # BLD: 163 K/ΜL
PMV BLD AUTO: ABNORMAL FL
POTASSIUM SERPL-SCNC: 3.7 MMOL/L
RBC # BLD: 4.31 10^6/ΜL
SODIUM BLD-SCNC: 137 MMOL/L
T4 TOTAL: 1.56
TSH SERPL DL<=0.05 MIU/L-ACNC: 10.12 UIU/ML
WBC # BLD: 8.31 10^3/ML

## 2022-08-31 ENCOUNTER — TELEPHONE (OUTPATIENT)
Dept: FAMILY MEDICINE CLINIC | Age: 87
End: 2022-08-31

## 2022-08-31 NOTE — TELEPHONE ENCOUNTER
[facility] Transitions Initial Follow Up Call    Outreach made within 2 business days of discharge: No    Patient: Yomaira Brownlee Patient : 3/5/1926   MRN: [id number]  Reason for Admission: Cardio  Discharge Date: 4/3/22       Spoke with: Mervat Arroyo for Jazmine Harper to contact office. My chart sent     Discharge department/facility: [facility]         Scheduled appointment with PCP within 7-14 days    Follow Up  No future appointments.     Erlinda Barney MA'

## 2022-09-13 DIAGNOSIS — K21.9 GASTROESOPHAGEAL REFLUX DISEASE WITHOUT ESOPHAGITIS: ICD-10-CM

## 2022-09-13 RX ORDER — PANTOPRAZOLE SODIUM 40 MG/1
40 TABLET, DELAYED RELEASE ORAL DAILY
Qty: 90 TABLET | Refills: 1 | Status: SHIPPED | OUTPATIENT
Start: 2022-09-13

## 2022-09-22 ENCOUNTER — TELEPHONE (OUTPATIENT)
Dept: FAMILY MEDICINE CLINIC | Age: 87
End: 2022-09-22

## 2022-09-22 NOTE — TELEPHONE ENCOUNTER
[address] for information regarding the change in levothyroxine dose. Valentino Lean is wanting to know who changed the dose for levothyroxine, if that new dose was a result of a lab result drawn that we do not have, and if someone else is managing the medications and care, they can sign for these instead of us. Spoke with Toro Cunningham, who transferred me to [facility]. [facility] is not in office on Thursdays so the call was routed to Jerilyn, her nurse on the floor. Jerilyn was able to tell me it looks like the levothyroxine dose was changed while he was in rehab at [place] [id number] but she cannot see any more information surrounding that. She stated that she would go to her medical records person and have them fax over all information on pt so we can review to see how it was changed and why. I provided fax number.

## 2022-09-27 DIAGNOSIS — G30.1 LATE ONSET ALZHEIMER'S DISEASE WITHOUT BEHAVIORAL DISTURBANCE (HCC): ICD-10-CM

## 2022-09-27 DIAGNOSIS — F02.80 LATE ONSET ALZHEIMER'S DISEASE WITHOUT BEHAVIORAL DISTURBANCE (HCC): ICD-10-CM

## 2022-09-27 RX ORDER — DONEPEZIL HYDROCHLORIDE 10 MG/1
10 TABLET, FILM COATED ORAL NIGHTLY
Qty: 90 TABLET | Refills: 1 | Status: SHIPPED | OUTPATIENT
Start: 2022-09-27

## 2022-09-27 NOTE — TELEPHONE ENCOUNTER
Last visit: 7/22/22  Last Med refill: 3/29/22  Does patient have enough medication for 72 hours: Yes    Next Visit Date:  No future appointments.     Health Maintenance   Topic Date Due    Flu vaccine (1) 08/01/2022    Lipids  12/07/2022 (Originally 10/2/2020)    DTaP/Tdap/Td vaccine (1 - Tdap) 12/07/2022 (Originally 3/5/1945)    Shingles vaccine (1 of 2) 12/07/2022 (Originally 3/5/1976)    COVID-19 Vaccine (2 - Booster for Karrie series) 12/07/2022 (Originally 6/24/2021)    Annual Wellness Visit (AWV)  12/08/2022    Depression Screen  07/22/2023    Pneumococcal 65+ years Vaccine  Completed    Hepatitis A vaccine  Aged Out    Hepatitis B vaccine  Aged Out    Hib vaccine  Aged Out    Meningococcal (ACWY) vaccine  Aged Out       Hemoglobin A1C (%)   Date Value   10/15/2015 5.1   05/21/2013 5.6             ( goal A1C is < 7)   No results found for: LABMICR  LDL Cholesterol (mg/dL)   Date Value   10/02/2019 73   05/03/2019 57       (goal LDL is <100)   AST (U/L)   Date Value   10/16/2020 13     ALT (U/L)   Date Value   10/16/2020 8     BUN (mg/dL)   Date Value   12/07/2021 15     BP Readings from Last 3 Encounters:   07/22/22 (!) 144/72   04/03/22 (!) 160/89   12/07/21 132/74          (goal 120/80)    All Future Testing planned in [facility]  Lab Frequency Next Occurrence               Patient Active Problem List:     BPH (benign prostatic hyperplasia)     Hypertension     Hyperlipidemia     Hypothyroidism     CAD (coronary artery disease)     GERD (gastroesophageal reflux disease)     Alzheimer's dementia ([address]     History of DVT of lower extremity     Chronic kidney disease, stage 3 (HCC)     Thoracic aortic atherosclerosis ([address]     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

## 2022-09-30 DIAGNOSIS — E78.2 MIXED HYPERLIPIDEMIA: Primary | ICD-10-CM

## 2022-09-30 DIAGNOSIS — E03.9 HYPOTHYROIDISM, UNSPECIFIED TYPE: ICD-10-CM

## 2022-10-10 LAB — T4 TOTAL: 1.19

## 2022-11-27 DIAGNOSIS — D50.9 IRON DEFICIENCY ANEMIA, UNSPECIFIED IRON DEFICIENCY ANEMIA TYPE: ICD-10-CM

## 2022-11-28 RX ORDER — FERROUS SULFATE 325(65) MG
325 TABLET ORAL 2 TIMES DAILY WITH MEALS
Qty: 180 TABLET | Refills: 1 | Status: SHIPPED | OUTPATIENT
Start: 2022-11-28 | End: 2022-11-29 | Stop reason: SDUPTHER

## 2022-11-28 NOTE — TELEPHONE ENCOUNTER
LOV 7/22/22  LRF 4/12/22     No future appt schedule. Pt is not due to come back till July. Health Maintenance   Topic Date Due    Flu vaccine (1) 08/01/2022    Annual Wellness Visit (AWV)  12/08/2022    Lipids  12/07/2022 (Originally 10/2/2020)    DTaP/Tdap/Td vaccine (1 - Tdap) 12/07/2022 (Originally 3/5/1945)    Shingles vaccine (1 of 2) 12/07/2022 (Originally 3/5/1976)    COVID-19 Vaccine (2 - Booster for Karrie series) 12/07/2022 (Originally 6/24/2021)    Depression Screen  07/22/2023    Pneumococcal 65+ years Vaccine  Completed    Hepatitis A vaccine  Aged Out    Hib vaccine  Aged Out    Meningococcal (ACWY) vaccine  Aged Out             (applicable per patient's age: Cancer Screenings, Depression Screening, Fall Risk Screening, Immunizations)    Hemoglobin A1C (%)   Date Value   10/15/2015 5.1   05/21/2013 5.6     LDL Cholesterol (mg/dL)   Date Value   10/02/2019 73     AST (U/L)   Date Value   10/16/2020 13     ALT (U/L)   Date Value   10/16/2020 8     BUN (mg/dL)   Date Value   08/29/2022 18      (goal A1C is < 7)   (goal LDL is <100) need 30-50% reduction from baseline     BP Readings from Last 3 Encounters:   07/22/22 (!) 144/72   04/03/22 (!) 160/89   12/07/21 132/74    (goal /80)      All Future Testing planned in [facility]:  Lab Frequency Next Occurrence   TSH Once 10/07/2022       Next Visit Date:  No future appointments.          Patient Active Problem List:     BPH (benign prostatic hyperplasia)     Hypertension     Hyperlipidemia     Hypothyroidism     CAD (coronary artery disease)     GERD (gastroesophageal reflux disease)     Alzheimer's dementia ([address]     History of DVT of lower extremity     Chronic kidney disease, stage 3 ([address]     Thoracic aortic atherosclerosis ([address]     Exudative age-related macular degeneration of both eyes with active choroidal neovascularization ([address]     Dry eyes

## 2022-11-29 ENCOUNTER — TELEPHONE (OUTPATIENT)
Dept: FAMILY MEDICINE CLINIC | Age: 87
End: 2022-11-29

## 2022-11-29 DIAGNOSIS — D50.9 IRON DEFICIENCY ANEMIA, UNSPECIFIED IRON DEFICIENCY ANEMIA TYPE: ICD-10-CM

## 2022-11-29 DIAGNOSIS — K59.03 DRUG-INDUCED CONSTIPATION: Primary | ICD-10-CM

## 2022-11-29 RX ORDER — FERROUS SULFATE 325(65) MG
325 TABLET ORAL
Qty: 90 TABLET | Refills: 1 | Status: SHIPPED | OUTPATIENT
Start: 2022-11-29 | End: 2023-11-29

## 2022-11-29 RX ORDER — POLYETHYLENE GLYCOL 3350 17 G/17G
17 POWDER, FOR SOLUTION ORAL DAILY
Qty: 1530 G | Refills: 1 | Status: SHIPPED | OUTPATIENT
Start: 2022-11-29 | End: 2023-11-29

## 2022-12-11 DIAGNOSIS — Z86.718 HISTORY OF DEEP VEIN THROMBOSIS (DVT) OF LOWER EXTREMITY: ICD-10-CM

## 2022-12-12 RX ORDER — RIVAROXABAN 2.5 MG/1
2.5 TABLET, FILM COATED ORAL 2 TIMES DAILY
Qty: 180 TABLET | Refills: 1 | Status: SHIPPED | OUTPATIENT
Start: 2022-12-12 | End: 2023-12-12

## 2022-12-12 NOTE — TELEPHONE ENCOUNTER
Last visit: 7/22/22Last Med refill: 3/2/22  Does patient have enough medication for 72 hours: Yes    Next Visit Date:  No future appointments.     Health Maintenance   Topic Date Due    DTaP/Tdap/Td vaccine (1 - Tdap) Never done    Shingles vaccine (1 of 2) Never done    Lipids  10/02/2020    COVID-19 Vaccine (2 - Booster for Karrie series) 06/24/2021    Flu vaccine (1) 08/01/2022    Annual Wellness Visit (AWV)  12/08/2022    Depression Screen  07/22/2023    Pneumococcal 65+ years Vaccine  Completed    Hepatitis A vaccine  Aged Out    Hib vaccine  Aged Out    Meningococcal (ACWY) vaccine  Aged Out       Hemoglobin A1C (%)   Date Value   10/15/2015 5.1   05/21/2013 5.6             ( goal A1C is < 7)   No results found for: LABMICR  LDL Cholesterol (mg/dL)   Date Value   10/02/2019 73   05/03/2019 57       (goal LDL is <100)   AST (U/L)   Date Value   10/16/2020 13     ALT (U/L)   Date Value   10/16/2020 8     BUN (mg/dL)   Date Value   08/29/2022 18     BP Readings from Last 3 Encounters:   07/22/22 (!) 144/72   04/03/22 (!) 160/89   12/07/21 132/74          (goal 120/80)    All Future Testing planned in [facility]  Lab Frequency Next Occurrence   TSH Once 10/07/2022               Patient Active Problem List:     BPH (benign prostatic hyperplasia)     Hypertension     Hyperlipidemia     Hypothyroidism     CAD (coronary artery disease)     GERD (gastroesophageal reflux disease)     Alzheimer's dementia ([address]     History of DVT of lower extremity     Chronic kidney disease, stage 3 (HCC)     Thoracic aortic atherosclerosis ([address]     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

## 2022-12-13 DIAGNOSIS — E03.9 HYPOTHYROIDISM, UNSPECIFIED TYPE: ICD-10-CM

## 2022-12-13 NOTE — TELEPHONE ENCOUNTER
Last visit: 07/22/2022  Last Med refill: 07/05/2022  Does patient have enough medication for 72 hours: no **Patient was increased to 112mcg from [facility] when he was hospitalized in August 2022. **    Next Visit Date:  No future appointments.     Health Maintenance   Topic Date Due    DTaP/Tdap/Td vaccine (1 - Tdap) Never done    Shingles vaccine (1 of 2) Never done    Lipids  10/02/2020    COVID-19 Vaccine (2 - Booster for Karrie series) 06/24/2021    Flu vaccine (1) 08/01/2022    Annual Wellness Visit (AWV)  12/08/2022    Depression Screen  07/22/2023    Pneumococcal 65+ years Vaccine  Completed    Hepatitis A vaccine  Aged Out    Hib vaccine  Aged Out    Meningococcal (ACWY) vaccine  Aged Out       Hemoglobin A1C (%)   Date Value   10/15/2015 5.1   05/21/2013 5.6             ( goal A1C is < 7)   No results found for: LABMICR  LDL Cholesterol (mg/dL)   Date Value   10/02/2019 73   05/03/2019 57       (goal LDL is <100)   AST (U/L)   Date Value   10/16/2020 13     ALT (U/L)   Date Value   10/16/2020 8     BUN (mg/dL)   Date Value   08/29/2022 18     BP Readings from Last 3 Encounters:   07/22/22 (!) 144/72   04/03/22 (!) 160/89   12/07/21 132/74          (goal 120/80)    All Future Testing planned in [facility]  Lab Frequency Next Occurrence   TSH Once 10/07/2022               Patient Active Problem List:     BPH (benign prostatic hyperplasia)     Hypertension     Hyperlipidemia     Hypothyroidism     CAD (coronary artery disease)     GERD (gastroesophageal reflux disease)     Alzheimer's dementia ([address]     History of DVT of lower extremity     Chronic kidney disease, stage 3 (HCC)     Thoracic aortic atherosclerosis ([address]     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

## 2022-12-14 RX ORDER — LEVOTHYROXINE SODIUM 0.1 MG/1
100 TABLET ORAL DAILY
Qty: 90 TABLET | Refills: 1 | Status: SHIPPED | OUTPATIENT
Start: 2022-12-14 | End: 2023-12-14

## 2023-03-14 DIAGNOSIS — G30.1 LATE ONSET ALZHEIMER'S DISEASE WITHOUT BEHAVIORAL DISTURBANCE (HCC): ICD-10-CM

## 2023-03-14 DIAGNOSIS — F02.80 LATE ONSET ALZHEIMER'S DISEASE WITHOUT BEHAVIORAL DISTURBANCE (HCC): ICD-10-CM

## 2023-03-15 NOTE — TELEPHONE ENCOUNTER
LOV 7/22/22  LRF 9/27/22     No future appt schedule. Health Maintenance   Topic Date Due    DTaP/Tdap/Td vaccine (1 - Tdap) Never done    Shingles vaccine (1 of 2) Never done    Lipids  10/02/2020    COVID-19 Vaccine (2 - Booster for Karrie series) 06/24/2021    Flu vaccine (1) 08/01/2022    Annual Wellness Visit (AWV)  12/08/2022    Depression Screen  07/22/2023    Pneumococcal 65+ years Vaccine  Completed    Hepatitis A vaccine  Aged Out    Hib vaccine  Aged Out    Meningococcal (ACWY) vaccine  Aged Out             (applicable per patient's age: Cancer Screenings, Depression Screening, Fall Risk Screening, Immunizations)    Hemoglobin A1C (%)   Date Value   10/15/2015 5.1   05/21/2013 5.6     LDL Cholesterol (mg/dL)   Date Value   10/02/2019 73     AST (U/L)   Date Value   10/16/2020 13     ALT (U/L)   Date Value   10/16/2020 8     BUN (mg/dL)   Date Value   08/29/2022 18      (goal A1C is < 7)   (goal LDL is <100) need 30-50% reduction from baseline     BP Readings from Last 3 Encounters:   07/22/22 (!) 144/72   04/03/22 (!) 160/89   12/07/21 132/74    (goal /80)      All Future Testing planned in [facility]:  Lab Frequency Next Occurrence   TSH Once 10/07/2022       Next Visit Date:  No future appointments.          Patient Active Problem List:     BPH (benign prostatic hyperplasia)     Hypertension     Hyperlipidemia     Hypothyroidism     CAD (coronary artery disease)     GERD (gastroesophageal reflux disease)     Alzheimer's dementia ([address]     History of DVT of lower extremity     Chronic kidney disease, stage 3 ([address]     Thoracic aortic atherosclerosis ([address]     Exudative age-related macular degeneration of both eyes with active choroidal neovascularization ([address]     Dry eyes

## 2023-03-16 RX ORDER — DONEPEZIL HYDROCHLORIDE 10 MG/1
10 TABLET, FILM COATED ORAL NIGHTLY
Qty: 90 TABLET | Refills: 1 | Status: SHIPPED | OUTPATIENT
Start: 2023-03-16

## 2023-03-31 DIAGNOSIS — D50.9 IRON DEFICIENCY ANEMIA, UNSPECIFIED IRON DEFICIENCY ANEMIA TYPE: ICD-10-CM

## 2023-03-31 RX ORDER — FERROUS SULFATE 325(65) MG
325 TABLET ORAL
Qty: 90 TABLET | Refills: 1 | Status: SHIPPED | OUTPATIENT
Start: 2023-03-31 | End: 2024-03-30

## 2023-05-25 DIAGNOSIS — K59.00 CONSTIPATION, UNSPECIFIED CONSTIPATION TYPE: ICD-10-CM

## 2023-05-26 RX ORDER — DOCUSATE SODIUM 100 MG/1
100 CAPSULE, LIQUID FILLED ORAL DAILY
Qty: 90 CAPSULE | Refills: 1 | Status: SHIPPED | OUTPATIENT
Start: 2023-05-26

## 2023-06-24 DIAGNOSIS — Z86.718 HISTORY OF DEEP VEIN THROMBOSIS (DVT) OF LOWER EXTREMITY: ICD-10-CM

## 2023-06-27 RX ORDER — RIVAROXABAN 2.5 MG/1
TABLET, FILM COATED ORAL
Qty: 180 TABLET | Refills: 1 | Status: SHIPPED | OUTPATIENT
Start: 2023-06-27

## 2023-07-03 DIAGNOSIS — K21.9 GASTROESOPHAGEAL REFLUX DISEASE WITHOUT ESOPHAGITIS: ICD-10-CM

## 2023-07-03 RX ORDER — PANTOPRAZOLE SODIUM 40 MG/1
TABLET, DELAYED RELEASE ORAL
Qty: 90 TABLET | Refills: 0 | Status: SHIPPED | OUTPATIENT
Start: 2023-07-03

## 2023-07-03 NOTE — TELEPHONE ENCOUNTER
LOV 7/22/2022  RTO not listed  LRF 9/13/2022      Pt at [facility]. Controlled Substance Monitoring:    Acute and Chronic Pain Monitoring:   RX Monitoring 4/3/2022   Periodic Controlled Substance Monitoring No signs of potential drug abuse or diversion identified.

## 2023-08-22 ENCOUNTER — TELEPHONE (OUTPATIENT)
Dept: FAMILY MEDICINE CLINIC | Age: 88
End: 2023-08-22

## 2023-09-24 DIAGNOSIS — K21.9 GASTROESOPHAGEAL REFLUX DISEASE WITHOUT ESOPHAGITIS: ICD-10-CM

## 2023-09-25 RX ORDER — PANTOPRAZOLE SODIUM 40 MG/1
40 TABLET, DELAYED RELEASE ORAL DAILY
Qty: 90 TABLET | Refills: 0 | OUTPATIENT
Start: 2023-09-25

## 2023-09-25 NOTE — TELEPHONE ENCOUNTER
Please see note last month from [facility]. Patient is now a resident at [facility]. Are we able to discharge patient from a practice?

## 2023-09-25 NOTE — TELEPHONE ENCOUNTER
LOV 7/22/2022     RTO n/a-msg sent  LRF 7/3/2023          Controlled Substance Monitoring:    Acute and Chronic Pain Monitoring:   RX Monitoring Periodic Controlled Substance Monitoring   4/3/2022  10:51 AM No signs of potential drug abuse or diversion identified.